# Patient Record
Sex: MALE | Race: WHITE | URBAN - METROPOLITAN AREA
[De-identification: names, ages, dates, MRNs, and addresses within clinical notes are randomized per-mention and may not be internally consistent; named-entity substitution may affect disease eponyms.]

---

## 2023-10-26 ENCOUNTER — EMERGENCY (EMERGENCY)
Facility: HOSPITAL | Age: 28
LOS: 1 days | Discharge: HOME | End: 2023-10-26
Admitting: EMERGENCY MEDICINE
Payer: COMMERCIAL

## 2023-10-26 VITALS
HEIGHT: 73 IN | TEMPERATURE: 98 F | WEIGHT: 179.9 LBS | SYSTOLIC BLOOD PRESSURE: 108 MMHG | HEART RATE: 83 BPM | DIASTOLIC BLOOD PRESSURE: 81 MMHG | RESPIRATION RATE: 16 BRPM | OXYGEN SATURATION: 97 %

## 2023-10-26 VITALS
OXYGEN SATURATION: 98 % | RESPIRATION RATE: 17 BRPM | SYSTOLIC BLOOD PRESSURE: 112 MMHG | TEMPERATURE: 98 F | DIASTOLIC BLOOD PRESSURE: 71 MMHG | HEART RATE: 62 BPM

## 2023-10-26 DIAGNOSIS — R10.11 RIGHT UPPER QUADRANT PAIN: ICD-10-CM

## 2023-10-26 DIAGNOSIS — Z87.19 PERSONAL HISTORY OF OTHER DISEASES OF THE DIGESTIVE SYSTEM: ICD-10-CM

## 2023-10-26 LAB
ALBUMIN SERPL ELPH-MCNC: 4 G/DL — SIGNIFICANT CHANGE UP (ref 3.4–5)
ALBUMIN SERPL ELPH-MCNC: 4 G/DL — SIGNIFICANT CHANGE UP (ref 3.4–5)
ALP SERPL-CCNC: 62 U/L — SIGNIFICANT CHANGE UP (ref 40–120)
ALP SERPL-CCNC: 62 U/L — SIGNIFICANT CHANGE UP (ref 40–120)
ALT FLD-CCNC: 17 U/L — SIGNIFICANT CHANGE UP (ref 12–42)
ALT FLD-CCNC: 17 U/L — SIGNIFICANT CHANGE UP (ref 12–42)
ANION GAP SERPL CALC-SCNC: 4 MMOL/L — LOW (ref 9–16)
ANION GAP SERPL CALC-SCNC: 4 MMOL/L — LOW (ref 9–16)
APPEARANCE UR: CLEAR — SIGNIFICANT CHANGE UP
APPEARANCE UR: CLEAR — SIGNIFICANT CHANGE UP
AST SERPL-CCNC: 16 U/L — SIGNIFICANT CHANGE UP (ref 15–37)
AST SERPL-CCNC: 16 U/L — SIGNIFICANT CHANGE UP (ref 15–37)
BASOPHILS # BLD AUTO: 0.03 K/UL — SIGNIFICANT CHANGE UP (ref 0–0.2)
BASOPHILS # BLD AUTO: 0.03 K/UL — SIGNIFICANT CHANGE UP (ref 0–0.2)
BASOPHILS NFR BLD AUTO: 0.5 % — SIGNIFICANT CHANGE UP (ref 0–2)
BASOPHILS NFR BLD AUTO: 0.5 % — SIGNIFICANT CHANGE UP (ref 0–2)
BILIRUB SERPL-MCNC: 0.4 MG/DL — SIGNIFICANT CHANGE UP (ref 0.2–1.2)
BILIRUB SERPL-MCNC: 0.4 MG/DL — SIGNIFICANT CHANGE UP (ref 0.2–1.2)
BILIRUB UR-MCNC: NEGATIVE — SIGNIFICANT CHANGE UP
BILIRUB UR-MCNC: NEGATIVE — SIGNIFICANT CHANGE UP
BUN SERPL-MCNC: 14 MG/DL — SIGNIFICANT CHANGE UP (ref 7–23)
BUN SERPL-MCNC: 14 MG/DL — SIGNIFICANT CHANGE UP (ref 7–23)
CALCIUM SERPL-MCNC: 9.2 MG/DL — SIGNIFICANT CHANGE UP (ref 8.5–10.5)
CALCIUM SERPL-MCNC: 9.2 MG/DL — SIGNIFICANT CHANGE UP (ref 8.5–10.5)
CHLORIDE SERPL-SCNC: 105 MMOL/L — SIGNIFICANT CHANGE UP (ref 96–108)
CHLORIDE SERPL-SCNC: 105 MMOL/L — SIGNIFICANT CHANGE UP (ref 96–108)
CO2 SERPL-SCNC: 31 MMOL/L — SIGNIFICANT CHANGE UP (ref 22–31)
CO2 SERPL-SCNC: 31 MMOL/L — SIGNIFICANT CHANGE UP (ref 22–31)
COLOR SPEC: YELLOW — SIGNIFICANT CHANGE UP
COLOR SPEC: YELLOW — SIGNIFICANT CHANGE UP
CREAT SERPL-MCNC: 1.06 MG/DL — SIGNIFICANT CHANGE UP (ref 0.5–1.3)
CREAT SERPL-MCNC: 1.06 MG/DL — SIGNIFICANT CHANGE UP (ref 0.5–1.3)
DIFF PNL FLD: NEGATIVE — SIGNIFICANT CHANGE UP
DIFF PNL FLD: NEGATIVE — SIGNIFICANT CHANGE UP
EGFR: 97 ML/MIN/1.73M2 — SIGNIFICANT CHANGE UP
EGFR: 97 ML/MIN/1.73M2 — SIGNIFICANT CHANGE UP
EOSINOPHIL # BLD AUTO: 0.33 K/UL — SIGNIFICANT CHANGE UP (ref 0–0.5)
EOSINOPHIL # BLD AUTO: 0.33 K/UL — SIGNIFICANT CHANGE UP (ref 0–0.5)
EOSINOPHIL NFR BLD AUTO: 5.8 % — SIGNIFICANT CHANGE UP (ref 0–6)
EOSINOPHIL NFR BLD AUTO: 5.8 % — SIGNIFICANT CHANGE UP (ref 0–6)
GLUCOSE SERPL-MCNC: 99 MG/DL — SIGNIFICANT CHANGE UP (ref 70–99)
GLUCOSE SERPL-MCNC: 99 MG/DL — SIGNIFICANT CHANGE UP (ref 70–99)
GLUCOSE UR QL: NEGATIVE MG/DL — SIGNIFICANT CHANGE UP
GLUCOSE UR QL: NEGATIVE MG/DL — SIGNIFICANT CHANGE UP
HCT VFR BLD CALC: 39.9 % — SIGNIFICANT CHANGE UP (ref 39–50)
HCT VFR BLD CALC: 39.9 % — SIGNIFICANT CHANGE UP (ref 39–50)
HGB BLD-MCNC: 13.1 G/DL — SIGNIFICANT CHANGE UP (ref 13–17)
HGB BLD-MCNC: 13.1 G/DL — SIGNIFICANT CHANGE UP (ref 13–17)
IMM GRANULOCYTES NFR BLD AUTO: 0.2 % — SIGNIFICANT CHANGE UP (ref 0–0.9)
IMM GRANULOCYTES NFR BLD AUTO: 0.2 % — SIGNIFICANT CHANGE UP (ref 0–0.9)
KETONES UR-MCNC: NEGATIVE MG/DL — SIGNIFICANT CHANGE UP
KETONES UR-MCNC: NEGATIVE MG/DL — SIGNIFICANT CHANGE UP
LEUKOCYTE ESTERASE UR-ACNC: NEGATIVE — SIGNIFICANT CHANGE UP
LEUKOCYTE ESTERASE UR-ACNC: NEGATIVE — SIGNIFICANT CHANGE UP
LIDOCAIN IGE QN: 19 U/L — SIGNIFICANT CHANGE UP (ref 16–77)
LIDOCAIN IGE QN: 19 U/L — SIGNIFICANT CHANGE UP (ref 16–77)
LYMPHOCYTES # BLD AUTO: 1.83 K/UL — SIGNIFICANT CHANGE UP (ref 1–3.3)
LYMPHOCYTES # BLD AUTO: 1.83 K/UL — SIGNIFICANT CHANGE UP (ref 1–3.3)
LYMPHOCYTES # BLD AUTO: 32.1 % — SIGNIFICANT CHANGE UP (ref 13–44)
LYMPHOCYTES # BLD AUTO: 32.1 % — SIGNIFICANT CHANGE UP (ref 13–44)
MAGNESIUM SERPL-MCNC: 1.9 MG/DL — SIGNIFICANT CHANGE UP (ref 1.6–2.6)
MAGNESIUM SERPL-MCNC: 1.9 MG/DL — SIGNIFICANT CHANGE UP (ref 1.6–2.6)
MCHC RBC-ENTMCNC: 29.8 PG — SIGNIFICANT CHANGE UP (ref 27–34)
MCHC RBC-ENTMCNC: 29.8 PG — SIGNIFICANT CHANGE UP (ref 27–34)
MCHC RBC-ENTMCNC: 32.8 GM/DL — SIGNIFICANT CHANGE UP (ref 32–36)
MCHC RBC-ENTMCNC: 32.8 GM/DL — SIGNIFICANT CHANGE UP (ref 32–36)
MCV RBC AUTO: 90.7 FL — SIGNIFICANT CHANGE UP (ref 80–100)
MCV RBC AUTO: 90.7 FL — SIGNIFICANT CHANGE UP (ref 80–100)
MONOCYTES # BLD AUTO: 0.57 K/UL — SIGNIFICANT CHANGE UP (ref 0–0.9)
MONOCYTES # BLD AUTO: 0.57 K/UL — SIGNIFICANT CHANGE UP (ref 0–0.9)
MONOCYTES NFR BLD AUTO: 10 % — SIGNIFICANT CHANGE UP (ref 2–14)
MONOCYTES NFR BLD AUTO: 10 % — SIGNIFICANT CHANGE UP (ref 2–14)
NEUTROPHILS # BLD AUTO: 2.93 K/UL — SIGNIFICANT CHANGE UP (ref 1.8–7.4)
NEUTROPHILS # BLD AUTO: 2.93 K/UL — SIGNIFICANT CHANGE UP (ref 1.8–7.4)
NEUTROPHILS NFR BLD AUTO: 51.4 % — SIGNIFICANT CHANGE UP (ref 43–77)
NEUTROPHILS NFR BLD AUTO: 51.4 % — SIGNIFICANT CHANGE UP (ref 43–77)
NITRITE UR-MCNC: NEGATIVE — SIGNIFICANT CHANGE UP
NITRITE UR-MCNC: NEGATIVE — SIGNIFICANT CHANGE UP
NRBC # BLD: 0 /100 WBCS — SIGNIFICANT CHANGE UP (ref 0–0)
NRBC # BLD: 0 /100 WBCS — SIGNIFICANT CHANGE UP (ref 0–0)
PH UR: 6 — SIGNIFICANT CHANGE UP (ref 5–8)
PH UR: 6 — SIGNIFICANT CHANGE UP (ref 5–8)
PLATELET # BLD AUTO: 222 K/UL — SIGNIFICANT CHANGE UP (ref 150–400)
PLATELET # BLD AUTO: 222 K/UL — SIGNIFICANT CHANGE UP (ref 150–400)
POTASSIUM SERPL-MCNC: 4 MMOL/L — SIGNIFICANT CHANGE UP (ref 3.5–5.3)
POTASSIUM SERPL-MCNC: 4 MMOL/L — SIGNIFICANT CHANGE UP (ref 3.5–5.3)
POTASSIUM SERPL-SCNC: 4 MMOL/L — SIGNIFICANT CHANGE UP (ref 3.5–5.3)
POTASSIUM SERPL-SCNC: 4 MMOL/L — SIGNIFICANT CHANGE UP (ref 3.5–5.3)
PROT SERPL-MCNC: 7.4 G/DL — SIGNIFICANT CHANGE UP (ref 6.4–8.2)
PROT SERPL-MCNC: 7.4 G/DL — SIGNIFICANT CHANGE UP (ref 6.4–8.2)
PROT UR-MCNC: NEGATIVE MG/DL — SIGNIFICANT CHANGE UP
PROT UR-MCNC: NEGATIVE MG/DL — SIGNIFICANT CHANGE UP
RBC # BLD: 4.4 M/UL — SIGNIFICANT CHANGE UP (ref 4.2–5.8)
RBC # BLD: 4.4 M/UL — SIGNIFICANT CHANGE UP (ref 4.2–5.8)
RBC # FLD: 11.9 % — SIGNIFICANT CHANGE UP (ref 10.3–14.5)
RBC # FLD: 11.9 % — SIGNIFICANT CHANGE UP (ref 10.3–14.5)
SODIUM SERPL-SCNC: 140 MMOL/L — SIGNIFICANT CHANGE UP (ref 132–145)
SODIUM SERPL-SCNC: 140 MMOL/L — SIGNIFICANT CHANGE UP (ref 132–145)
SP GR SPEC: 1.01 — SIGNIFICANT CHANGE UP (ref 1–1.03)
SP GR SPEC: 1.01 — SIGNIFICANT CHANGE UP (ref 1–1.03)
UROBILINOGEN FLD QL: 0.2 MG/DL — SIGNIFICANT CHANGE UP (ref 0.2–1)
UROBILINOGEN FLD QL: 0.2 MG/DL — SIGNIFICANT CHANGE UP (ref 0.2–1)
WBC # BLD: 5.7 K/UL — SIGNIFICANT CHANGE UP (ref 3.8–10.5)
WBC # BLD: 5.7 K/UL — SIGNIFICANT CHANGE UP (ref 3.8–10.5)
WBC # FLD AUTO: 5.7 K/UL — SIGNIFICANT CHANGE UP (ref 3.8–10.5)
WBC # FLD AUTO: 5.7 K/UL — SIGNIFICANT CHANGE UP (ref 3.8–10.5)

## 2023-10-26 PROCEDURE — 99285 EMERGENCY DEPT VISIT HI MDM: CPT

## 2023-10-26 PROCEDURE — 74177 CT ABD & PELVIS W/CONTRAST: CPT | Mod: 26

## 2023-10-26 PROCEDURE — 71046 X-RAY EXAM CHEST 2 VIEWS: CPT | Mod: 26

## 2023-10-26 PROCEDURE — 76705 ECHO EXAM OF ABDOMEN: CPT | Mod: 26

## 2023-10-26 RX ORDER — MORPHINE SULFATE 50 MG/1
4 CAPSULE, EXTENDED RELEASE ORAL ONCE
Refills: 0 | Status: DISCONTINUED | OUTPATIENT
Start: 2023-10-26 | End: 2023-10-26

## 2023-10-26 RX ORDER — ONDANSETRON 8 MG/1
4 TABLET, FILM COATED ORAL ONCE
Refills: 0 | Status: COMPLETED | OUTPATIENT
Start: 2023-10-26 | End: 2023-10-26

## 2023-10-26 RX ORDER — SODIUM CHLORIDE 9 MG/ML
1000 INJECTION INTRAMUSCULAR; INTRAVENOUS; SUBCUTANEOUS ONCE
Refills: 0 | Status: COMPLETED | OUTPATIENT
Start: 2023-10-26 | End: 2023-10-26

## 2023-10-26 RX ORDER — KETOROLAC TROMETHAMINE 30 MG/ML
30 SYRINGE (ML) INJECTION ONCE
Refills: 0 | Status: DISCONTINUED | OUTPATIENT
Start: 2023-10-26 | End: 2023-10-26

## 2023-10-26 RX ADMIN — ONDANSETRON 4 MILLIGRAM(S): 8 TABLET, FILM COATED ORAL at 11:40

## 2023-10-26 RX ADMIN — Medication 30 MILLIGRAM(S): at 14:27

## 2023-10-26 RX ADMIN — SODIUM CHLORIDE 1000 MILLILITER(S): 9 INJECTION INTRAMUSCULAR; INTRAVENOUS; SUBCUTANEOUS at 11:33

## 2023-10-26 RX ADMIN — MORPHINE SULFATE 4 MILLIGRAM(S): 50 CAPSULE, EXTENDED RELEASE ORAL at 11:39

## 2023-10-26 NOTE — ED PROVIDER NOTE - CLINICAL SUMMARY MEDICAL DECISION MAKING FREE TEXT BOX
young male with RUQ abd pain, hx gallstones, will obtain labs, US r/o cholecystitis, analgesia, IVF, consider CT abdomen if US inconclusive and persistent symptoms.

## 2023-10-26 NOTE — ED ADULT NURSE REASSESSMENT NOTE - NS ED NURSE REASSESS COMMENT FT1
Break coverage for pascual lees, introduced self to patient, pain meds given iv, NKDA, gcs 15 nil obvious distress awaiting medical review

## 2023-10-26 NOTE — ED ADULT NURSE NOTE - NS ED PATIENT SAFETY CONCERN
GENERAL PRE-PROCEDURE:   Procedure:  Chest port placement with procedural sedation  Date/Time:  4/22/2021 2:46 PM    Written consent obtained?: Yes    Risks and benefits: Risks, benefits and alternatives were discussed    Consent given by:  Guardian  Patient states understanding of procedure being performed: Yes    Patient's understanding of procedure matches consent: Yes    Procedure consent matches procedure scheduled: Yes    Expected level of sedation:  Moderate  Appropriately NPO:  Yes  ASA Class:  Class 2- mild systemic disease, no acute problems, no functional limitations  Mallampati  :  Grade 2- soft palate, base of uvula, tonsillar pillars, and portion of posterior pharyngeal wall visible  Lungs:  Lungs clear with good breath sounds bilaterally  Heart:  Normal heart sounds and rate  History & Physical reviewed:  History and physical reviewed and no updates needed  Statement of review:  I have reviewed the lab findings, diagnostic data, medications, and the plan for sedation     No

## 2023-10-26 NOTE — ED PROVIDER NOTE - PATIENT PORTAL LINK FT
You can access the FollowMyHealth Patient Portal offered by [facility] by registering at the following website: [email or web address]. By joining [employer]’s FollowMyHealth portal, you will also be able to view your health information using other applications (apps) compatible with our system.

## 2023-10-26 NOTE — ED ADULT NURSE NOTE - DOES PATIENT HAVE ADVANCE DIRECTIVE
[Has the patient missed work because of the injury/illness?] : The patient has missed work because of the injury/illness. [No] : The patient is currently not working. [de-identified] : CC LEFT shoulder\par \par HPI 55 y RHF presents for 2mo fu of acute onset 6 weeks pain sc and lateral and neck and posterior right shoulder lifting heavy object. \par The pain is WORSE, and rated a 10 out of 10, described as severe WITH radiation\par nothing makes the pain better and everything makes the pain worse.\par The patient reports associated symptoms of hand tingling\par The patient DENIES pain at night affecting sleep, DENIES neck pain, and DENIES similar pain previously.\par \par The patient has tried the following treatments:\par Activity modification	+\par Ice			+\par Nsaids    		+\par Physical Therapy  	-\par Cortisone Injection	+ subacromial 3-21-22 several days help\par Arthroscopy/Surgery	-\par \par pain continues\par in light duty, able to do wo significant issues\par \par Review of Systems is positive for the above musculoskeletal symptoms and is otherwise non-contributory for general, constitutional, psychiatric, neurologic, HEENT, cardiac, respiratory, gastrointestinal, reproductive, lymphatic, and dermatologic complaints.\par \par Consult by Dr MARTINEZ\par \par  [FreeTextEntry1] : pain and limited motion and strength 2 mo after lifting heavy object [FreeTextEntry2] : HHA began early feb [FreeTextEntry3] : self care, cleaning, cooking [FreeTextEntry4] : CSI several days improvement 3-21-22 [FreeTextEntry6] : 2-23-22 No

## 2023-10-26 NOTE — ED PROVIDER NOTE - OBJECTIVE STATEMENT
27 yo male with hx gallstones presents c/o RUQ pain since this morning, no nausea/vomiting/fever or chills. no cp/sob. no flank pain or urinary symptoms.

## 2023-10-26 NOTE — ED ADULT NURSE NOTE - NSFALLUNIVINTERV_ED_ALL_ED
Bed/Stretcher in lowest position, wheels locked, appropriate side rails in place/Call bell, personal items and telephone in reach/Instruct patient to call for assistance before getting out of bed/chair/stretcher/Non-slip footwear applied when patient is off stretcher/[place] to call system/Physically safe environment - no spills, clutter or unnecessary equipment/Purposeful proactive rounding/Room/bathroom lighting operational, light cord in reach

## 2023-10-26 NOTE — ED ADULT NURSE NOTE - OBJECTIVE STATEMENT
Pt with RUQ pain since 3 am, has been constant. Sent from . Nausea, no emesis, no diarrhea, no fevers, no abdominal surgical hx.

## 2023-10-26 NOTE — ED PROVIDER NOTE - PROGRESS NOTE DETAILS
US no stones, CBD normal size. CT abdomen ordered with no acute findings. patient looks well, feeling better. discussed all labs and radiology findings, he was advised to follow up GI/pmd. he agrees with plan

## 2023-10-26 NOTE — ED PROVIDER NOTE - NSFOLLOWUPINSTRUCTIONS_ED_ALL_ED_FT

## 2023-10-26 NOTE — ED PROVIDER NOTE - PHYSICAL EXAMINATION
CONSTITUTIONAL: Well-appearing; well-nourished; in no apparent distress.   	HEAD: Normocephalic; atraumatic.   	EYES:  conjunctiva and sclera clear  	ENT: normal nose; no rhinorrhea; normal pharynx with no erythema or lesions.   	NECK: Supple; non-tender;   	CARDIOVASCULAR: Normal S1, S2; no murmurs, rubs, or gallops. Regular rate and rhythm.   	RESPIRATORY: Breathing easily; breath sounds clear and equal bilaterally; no wheezes, rhonchi, or rales.  	GI: Soft; non-distended; mild RUQ tenderness. no guarding or rebound. no cvat bilaterally  	EXT: MADRID x 4.   	SKIN: Normal for age and race; warm; dry; good turgor; no apparent lesions or rash.   	NEURO: A & O x 3; face symmetric; grossly unremarkable.   PSYCHOLOGICAL: The patient’s mood and manner are appropriate.

## 2025-05-24 ENCOUNTER — EMERGENCY (EMERGENCY)
Facility: HOSPITAL | Age: 30
LOS: 1 days | End: 2025-05-24
Attending: EMERGENCY MEDICINE | Admitting: EMERGENCY MEDICINE
Payer: COMMERCIAL

## 2025-05-24 VITALS
TEMPERATURE: 98 F | DIASTOLIC BLOOD PRESSURE: 88 MMHG | OXYGEN SATURATION: 99 % | SYSTOLIC BLOOD PRESSURE: 156 MMHG | RESPIRATION RATE: 17 BRPM | HEART RATE: 82 BPM

## 2025-05-24 VITALS
HEART RATE: 97 BPM | WEIGHT: 179.9 LBS | RESPIRATION RATE: 16 BRPM | SYSTOLIC BLOOD PRESSURE: 149 MMHG | DIASTOLIC BLOOD PRESSURE: 88 MMHG | OXYGEN SATURATION: 98 % | HEIGHT: 73 IN | TEMPERATURE: 97 F

## 2025-05-24 PROCEDURE — 99284 EMERGENCY DEPT VISIT MOD MDM: CPT

## 2025-05-24 RX ORDER — IBUPROFEN 200 MG
800 TABLET ORAL ONCE
Refills: 0 | Status: COMPLETED | OUTPATIENT
Start: 2025-05-24 | End: 2025-05-24

## 2025-05-24 RX ORDER — IBUPROFEN 200 MG
1 TABLET ORAL
Qty: 21 | Refills: 0
Start: 2025-05-24 | End: 2025-05-30

## 2025-05-24 RX ORDER — OXYCODONE HYDROCHLORIDE AND ACETAMINOPHEN 10; 325 MG/1; MG/1
1 TABLET ORAL
Qty: 12 | Refills: 0
Start: 2025-05-24 | End: 2025-05-26

## 2025-05-24 RX ORDER — OXYCODONE HYDROCHLORIDE AND ACETAMINOPHEN 10; 325 MG/1; MG/1
1 TABLET ORAL ONCE
Refills: 0 | Status: DISCONTINUED | OUTPATIENT
Start: 2025-05-24 | End: 2025-05-24

## 2025-05-24 RX ADMIN — OXYCODONE HYDROCHLORIDE AND ACETAMINOPHEN 1 TABLET(S): 10; 325 TABLET ORAL at 14:45

## 2025-05-24 RX ADMIN — Medication 1000 MILLIGRAM(S): at 14:45

## 2025-05-24 RX ADMIN — Medication 800 MILLIGRAM(S): at 14:45

## 2025-05-24 NOTE — ED ADULT TRIAGE NOTE - CHIEF COMPLAINT QUOTE
Walk in sent from  for eval of Rt sided headache x5 days. Pt reports progressive onset symptoms which have been worsening and now affecting his balance and causing blurry vision. PMH migraines.

## 2025-05-24 NOTE — ED PROVIDER NOTE - PHYSICAL EXAMINATION
Vesicular rash on right scalp extending from upper neck towards right upper forehead.  Mild tenderness to palpation to this area.

## 2025-05-24 NOTE — ED PROVIDER NOTE - CLINICAL SUMMARY MEDICAL DECISION MAKING FREE TEXT BOX
29-year-old male with right-sided headache and rash.  PE as above.    Physical exam consistent with shingles.  Will give pain medications and Valtrex and reassess.

## 2025-05-24 NOTE — ED PROVIDER NOTE - PATIENT PORTAL LINK FT
You can access the FollowMyHealth Patient Portal offered by [facility] by registering at the following website: [email or web address]. By joining [employer]’s FollowMyHealth portal, you will also be able to view your health information using other applications (apps) compatible with our system.

## 2025-05-24 NOTE — ED PROVIDER NOTE - PROGRESS NOTE DETAILS
Pain improved.  Will discharge with pain medications and Valtrex.  Follow-up with primary care doctor.  Return precautions discussed.

## 2025-05-24 NOTE — ED PROVIDER NOTE - OBJECTIVE STATEMENT
29-year-old male denies past medical history coming in with a 2 to 3-day history of right sided headache.  Noticed there are also some small red bumps on his scalp.   never had symptoms like this before.  Went to urgent care and was referred to the ED for evaluation.   also has some mild flulike symptoms.

## 2025-05-26 DIAGNOSIS — B02.9 ZOSTER WITHOUT COMPLICATIONS: ICD-10-CM

## 2025-05-26 DIAGNOSIS — R51.9 HEADACHE, UNSPECIFIED: ICD-10-CM

## 2025-05-28 ENCOUNTER — INPATIENT (INPATIENT)
Age: 30
LOS: 2 days | Discharge: HOME | End: 2025-05-31
Attending: INTERNAL MEDICINE | Admitting: STUDENT IN AN ORGANIZED HEALTH CARE EDUCATION/TRAINING PROGRAM
Payer: COMMERCIAL

## 2025-05-28 VITALS
RESPIRATION RATE: 18 BRPM | DIASTOLIC BLOOD PRESSURE: 97 MMHG | WEIGHT: 179.9 LBS | HEIGHT: 73 IN | HEART RATE: 97 BPM | SYSTOLIC BLOOD PRESSURE: 143 MMHG | TEMPERATURE: 98 F | OXYGEN SATURATION: 98 %

## 2025-05-28 LAB
ALBUMIN SERPL ELPH-MCNC: 4.2 G/DL — SIGNIFICANT CHANGE UP (ref 3.4–5)
ALP SERPL-CCNC: 62 U/L — SIGNIFICANT CHANGE UP (ref 40–120)
ALT FLD-CCNC: 31 U/L — SIGNIFICANT CHANGE UP (ref 12–42)
ANION GAP SERPL CALC-SCNC: 9 MMOL/L — SIGNIFICANT CHANGE UP (ref 9–16)
AST SERPL-CCNC: 23 U/L — SIGNIFICANT CHANGE UP (ref 15–37)
BASOPHILS # BLD AUTO: 0.05 K/UL — SIGNIFICANT CHANGE UP (ref 0–0.2)
BASOPHILS NFR BLD AUTO: 0.8 % — SIGNIFICANT CHANGE UP (ref 0–2)
BILIRUB SERPL-MCNC: 0.4 MG/DL — SIGNIFICANT CHANGE UP (ref 0.2–1.2)
BUN SERPL-MCNC: 16 MG/DL — SIGNIFICANT CHANGE UP (ref 7–23)
CALCIUM SERPL-MCNC: 9.1 MG/DL — SIGNIFICANT CHANGE UP (ref 8.5–10.5)
CHLORIDE SERPL-SCNC: 103 MMOL/L — SIGNIFICANT CHANGE UP (ref 96–108)
CO2 SERPL-SCNC: 28 MMOL/L — SIGNIFICANT CHANGE UP (ref 22–31)
CREAT SERPL-MCNC: 1.03 MG/DL — SIGNIFICANT CHANGE UP (ref 0.5–1.3)
EGFR: 100 ML/MIN/1.73M2 — SIGNIFICANT CHANGE UP
EGFR: 100 ML/MIN/1.73M2 — SIGNIFICANT CHANGE UP
EOSINOPHIL # BLD AUTO: 0.22 K/UL — SIGNIFICANT CHANGE UP (ref 0–0.5)
EOSINOPHIL NFR BLD AUTO: 3.6 % — SIGNIFICANT CHANGE UP (ref 0–6)
GLUCOSE CSF-MCNC: 61 MG/DL — SIGNIFICANT CHANGE UP (ref 40–70)
GLUCOSE SERPL-MCNC: 91 MG/DL — SIGNIFICANT CHANGE UP (ref 70–99)
HCT VFR BLD CALC: 41.8 % — SIGNIFICANT CHANGE UP (ref 39–50)
HGB BLD-MCNC: 13.8 G/DL — SIGNIFICANT CHANGE UP (ref 13–17)
HIV 1 & 2 AB SERPL IA.RAPID: SIGNIFICANT CHANGE UP
IMM GRANULOCYTES # BLD AUTO: 0.02 K/UL — SIGNIFICANT CHANGE UP (ref 0–0.07)
IMM GRANULOCYTES NFR BLD AUTO: 0.3 % — SIGNIFICANT CHANGE UP (ref 0–0.9)
LACTATE BLDV-MCNC: 1.6 MMOL/L — SIGNIFICANT CHANGE UP (ref 0.5–2)
LYMPHOCYTES # BLD AUTO: 1.95 K/UL — SIGNIFICANT CHANGE UP (ref 1–3.3)
LYMPHOCYTES NFR BLD AUTO: 32 % — SIGNIFICANT CHANGE UP (ref 13–44)
MCHC RBC-ENTMCNC: 29.8 PG — SIGNIFICANT CHANGE UP (ref 27–34)
MCHC RBC-ENTMCNC: 33 G/DL — SIGNIFICANT CHANGE UP (ref 32–36)
MCV RBC AUTO: 90.3 FL — SIGNIFICANT CHANGE UP (ref 80–100)
MONOCYTES # BLD AUTO: 0.57 K/UL — SIGNIFICANT CHANGE UP (ref 0–0.9)
MONOCYTES NFR BLD AUTO: 9.3 % — SIGNIFICANT CHANGE UP (ref 2–14)
NEUTROPHILS # BLD AUTO: 3.29 K/UL — SIGNIFICANT CHANGE UP (ref 1.8–7.4)
NEUTROPHILS NFR BLD AUTO: 54 % — SIGNIFICANT CHANGE UP (ref 43–77)
NRBC # BLD AUTO: 0 K/UL — SIGNIFICANT CHANGE UP (ref 0–0)
NRBC # FLD: 0 K/UL — SIGNIFICANT CHANGE UP (ref 0–0)
NRBC BLD AUTO-RTO: 0 /100 WBCS — SIGNIFICANT CHANGE UP (ref 0–0)
PLATELET # BLD AUTO: 234 K/UL — SIGNIFICANT CHANGE UP (ref 150–400)
PMV BLD: 9.4 FL — SIGNIFICANT CHANGE UP (ref 7–13)
POTASSIUM SERPL-MCNC: 4.1 MMOL/L — SIGNIFICANT CHANGE UP (ref 3.5–5.3)
POTASSIUM SERPL-SCNC: 4.1 MMOL/L — SIGNIFICANT CHANGE UP (ref 3.5–5.3)
PROT CSF-MCNC: 55 MG/DL — HIGH (ref 15–45)
PROT SERPL-MCNC: 8.3 G/DL — HIGH (ref 6.4–8.2)
RBC # BLD: 4.63 M/UL — SIGNIFICANT CHANGE UP (ref 4.2–5.8)
RBC # FLD: 12.4 % — SIGNIFICANT CHANGE UP (ref 10.3–14.5)
SODIUM SERPL-SCNC: 140 MMOL/L — SIGNIFICANT CHANGE UP (ref 132–145)
WBC # BLD: 6.1 K/UL — SIGNIFICANT CHANGE UP (ref 3.8–10.5)
WBC # FLD AUTO: 6.1 K/UL — SIGNIFICANT CHANGE UP (ref 3.8–10.5)

## 2025-05-28 PROCEDURE — 70450 CT HEAD/BRAIN W/O DYE: CPT | Mod: 26

## 2025-05-28 PROCEDURE — 99285 EMERGENCY DEPT VISIT HI MDM: CPT

## 2025-05-28 RX ORDER — METOCLOPRAMIDE HCL 10 MG
10 TABLET ORAL ONCE
Refills: 0 | Status: COMPLETED | OUTPATIENT
Start: 2025-05-28 | End: 2025-05-28

## 2025-05-28 RX ORDER — KETOROLAC TROMETHAMINE 30 MG/ML
30 INJECTION, SOLUTION INTRAMUSCULAR; INTRAVENOUS ONCE
Refills: 0 | Status: DISCONTINUED | OUTPATIENT
Start: 2025-05-28 | End: 2025-05-28

## 2025-05-28 RX ORDER — CEFTRIAXONE 500 MG/1
2000 INJECTION, POWDER, FOR SOLUTION INTRAMUSCULAR; INTRAVENOUS ONCE
Refills: 0 | Status: COMPLETED | OUTPATIENT
Start: 2025-05-28 | End: 2025-05-28

## 2025-05-29 DIAGNOSIS — Z00.00 ENCOUNTER FOR GENERAL ADULT MEDICAL EXAMINATION WITHOUT ABNORMAL FINDINGS: ICD-10-CM

## 2025-05-29 DIAGNOSIS — G04.90 ENCEPHALITIS AND ENCEPHALOMYELITIS, UNSPECIFIED: ICD-10-CM

## 2025-05-29 LAB
ANION GAP SERPL CALC-SCNC: 11 MMOL/L — SIGNIFICANT CHANGE UP (ref 5–17)
BASOPHILS # BLD AUTO: 0.06 K/UL — SIGNIFICANT CHANGE UP (ref 0–0.2)
BASOPHILS NFR BLD AUTO: 0.9 % — SIGNIFICANT CHANGE UP (ref 0–2)
BUN SERPL-MCNC: 22 MG/DL — SIGNIFICANT CHANGE UP (ref 7–23)
CALCIUM SERPL-MCNC: 8.9 MG/DL — SIGNIFICANT CHANGE UP (ref 8.4–10.5)
CHLORIDE SERPL-SCNC: 106 MMOL/L — SIGNIFICANT CHANGE UP (ref 96–108)
CO2 SERPL-SCNC: 23 MMOL/L — SIGNIFICANT CHANGE UP (ref 22–31)
CREAT SERPL-MCNC: 1.13 MG/DL — SIGNIFICANT CHANGE UP (ref 0.5–1.3)
CSF PCR RESULT: SIGNIFICANT CHANGE UP
EGFR: 90 ML/MIN/1.73M2 — SIGNIFICANT CHANGE UP
EGFR: 90 ML/MIN/1.73M2 — SIGNIFICANT CHANGE UP
EOSINOPHIL # BLD AUTO: 0.39 K/UL — SIGNIFICANT CHANGE UP (ref 0–0.5)
EOSINOPHIL NFR BLD AUTO: 5.7 % — SIGNIFICANT CHANGE UP (ref 0–6)
GLUCOSE SERPL-MCNC: 95 MG/DL — SIGNIFICANT CHANGE UP (ref 70–99)
GRAM STN FLD: SIGNIFICANT CHANGE UP
HCT VFR BLD CALC: 38.7 % — LOW (ref 39–50)
HGB BLD-MCNC: 13 G/DL — SIGNIFICANT CHANGE UP (ref 13–17)
IMM GRANULOCYTES NFR BLD AUTO: 0.3 % — SIGNIFICANT CHANGE UP (ref 0–0.9)
LYMPHOCYTES # BLD AUTO: 2.34 K/UL — SIGNIFICANT CHANGE UP (ref 1–3.3)
LYMPHOCYTES # BLD AUTO: 34.3 % — SIGNIFICANT CHANGE UP (ref 13–44)
MAGNESIUM SERPL-MCNC: 2.2 MG/DL — SIGNIFICANT CHANGE UP (ref 1.6–2.6)
MCHC RBC-ENTMCNC: 31.1 PG — SIGNIFICANT CHANGE UP (ref 27–34)
MCHC RBC-ENTMCNC: 33.6 G/DL — SIGNIFICANT CHANGE UP (ref 32–36)
MCV RBC AUTO: 92.6 FL — SIGNIFICANT CHANGE UP (ref 80–100)
MONOCYTES # BLD AUTO: 0.76 K/UL — SIGNIFICANT CHANGE UP (ref 0–0.9)
MONOCYTES NFR BLD AUTO: 11.1 % — SIGNIFICANT CHANGE UP (ref 2–14)
NEUTROPHILS # BLD AUTO: 3.25 K/UL — SIGNIFICANT CHANGE UP (ref 1.8–7.4)
NEUTROPHILS NFR BLD AUTO: 47.7 % — SIGNIFICANT CHANGE UP (ref 43–77)
NRBC BLD AUTO-RTO: 0 /100 WBCS — SIGNIFICANT CHANGE UP (ref 0–0)
PHOSPHATE SERPL-MCNC: 5.1 MG/DL — HIGH (ref 2.5–4.5)
PLATELET # BLD AUTO: 225 K/UL — SIGNIFICANT CHANGE UP (ref 150–400)
POTASSIUM SERPL-MCNC: 4.3 MMOL/L — SIGNIFICANT CHANGE UP (ref 3.5–5.3)
POTASSIUM SERPL-SCNC: 4.3 MMOL/L — SIGNIFICANT CHANGE UP (ref 3.5–5.3)
RBC # BLD: 4.18 M/UL — LOW (ref 4.2–5.8)
RBC # FLD: 12.3 % — SIGNIFICANT CHANGE UP (ref 10.3–14.5)
SODIUM SERPL-SCNC: 140 MMOL/L — SIGNIFICANT CHANGE UP (ref 135–145)
SPECIMEN SOURCE: SIGNIFICANT CHANGE UP
WBC # BLD: 6.82 K/UL — SIGNIFICANT CHANGE UP (ref 3.8–10.5)
WBC # FLD AUTO: 6.82 K/UL — SIGNIFICANT CHANGE UP (ref 3.8–10.5)

## 2025-05-29 PROCEDURE — 93010 ELECTROCARDIOGRAM REPORT: CPT

## 2025-05-29 PROCEDURE — 99222 1ST HOSP IP/OBS MODERATE 55: CPT

## 2025-05-29 PROCEDURE — 99223 1ST HOSP IP/OBS HIGH 75: CPT

## 2025-05-29 RX ORDER — OXYCODONE HYDROCHLORIDE 30 MG/1
5 TABLET ORAL EVERY 6 HOURS
Refills: 0 | Status: DISCONTINUED | OUTPATIENT
Start: 2025-05-29 | End: 2025-05-31

## 2025-05-29 RX ORDER — KETOROLAC TROMETHAMINE 30 MG/ML
15 INJECTION, SOLUTION INTRAMUSCULAR; INTRAVENOUS ONCE
Refills: 0 | Status: DISCONTINUED | OUTPATIENT
Start: 2025-05-29 | End: 2025-05-29

## 2025-05-29 RX ORDER — ACETAMINOPHEN 500 MG/5ML
650 LIQUID (ML) ORAL EVERY 6 HOURS
Refills: 0 | Status: DISCONTINUED | OUTPATIENT
Start: 2025-05-29 | End: 2025-05-31

## 2025-05-29 RX ORDER — KETOROLAC TROMETHAMINE 30 MG/ML
15 INJECTION, SOLUTION INTRAMUSCULAR; INTRAVENOUS EVERY 6 HOURS
Refills: 0 | Status: DISCONTINUED | OUTPATIENT
Start: 2025-05-29 | End: 2025-05-31

## 2025-05-29 RX ORDER — ONDANSETRON HCL/PF 4 MG/2 ML
4 VIAL (ML) INJECTION EVERY 8 HOURS
Refills: 0 | Status: DISCONTINUED | OUTPATIENT
Start: 2025-05-29 | End: 2025-05-31

## 2025-05-29 RX ORDER — DIAZEPAM 5 MG/1
1 TABLET ORAL
Refills: 0 | DISCHARGE

## 2025-05-29 RX ADMIN — Medication 650 MILLIGRAM(S): at 09:43

## 2025-05-29 RX ADMIN — Medication 650 MILLIGRAM(S): at 15:50

## 2025-05-29 RX ADMIN — KETOROLAC TROMETHAMINE 15 MILLIGRAM(S): 30 INJECTION, SOLUTION INTRAMUSCULAR; INTRAVENOUS at 19:38

## 2025-05-29 RX ADMIN — Medication 266 MILLIGRAM(S): at 17:16

## 2025-05-29 RX ADMIN — Medication 650 MILLIGRAM(S): at 10:43

## 2025-05-29 RX ADMIN — Medication 100 MILLILITER(S): at 09:42

## 2025-05-29 RX ADMIN — Medication 266 MILLIGRAM(S): at 09:19

## 2025-05-29 RX ADMIN — OXYCODONE HYDROCHLORIDE 5 MILLIGRAM(S): 30 TABLET ORAL at 19:36

## 2025-05-29 RX ADMIN — Medication 650 MILLIGRAM(S): at 16:50

## 2025-05-29 RX ADMIN — KETOROLAC TROMETHAMINE 15 MILLIGRAM(S): 30 INJECTION, SOLUTION INTRAMUSCULAR; INTRAVENOUS at 19:10

## 2025-05-29 RX ADMIN — Medication 266 MILLIGRAM(S): at 01:47

## 2025-05-30 LAB
ALBUMIN SERPL ELPH-MCNC: 3.8 G/DL — SIGNIFICANT CHANGE UP (ref 3.3–5)
ALP SERPL-CCNC: 53 U/L — SIGNIFICANT CHANGE UP (ref 40–120)
ALT FLD-CCNC: 17 U/L — SIGNIFICANT CHANGE UP (ref 10–45)
ANION GAP SERPL CALC-SCNC: 9 MMOL/L — SIGNIFICANT CHANGE UP (ref 5–17)
AST SERPL-CCNC: 16 U/L — SIGNIFICANT CHANGE UP (ref 10–40)
BASOPHILS # BLD AUTO: 0.05 K/UL — SIGNIFICANT CHANGE UP (ref 0–0.2)
BASOPHILS NFR BLD AUTO: 0.9 % — SIGNIFICANT CHANGE UP (ref 0–2)
BILIRUB SERPL-MCNC: 0.3 MG/DL — SIGNIFICANT CHANGE UP (ref 0.2–1.2)
BUN SERPL-MCNC: 15 MG/DL — SIGNIFICANT CHANGE UP (ref 7–23)
CALCIUM SERPL-MCNC: 8.5 MG/DL — SIGNIFICANT CHANGE UP (ref 8.4–10.5)
CHLORIDE SERPL-SCNC: 109 MMOL/L — HIGH (ref 96–108)
CO2 SERPL-SCNC: 24 MMOL/L — SIGNIFICANT CHANGE UP (ref 22–31)
CREAT SERPL-MCNC: 0.95 MG/DL — SIGNIFICANT CHANGE UP (ref 0.5–1.3)
EGFR: 110 ML/MIN/1.73M2 — SIGNIFICANT CHANGE UP
EGFR: 110 ML/MIN/1.73M2 — SIGNIFICANT CHANGE UP
EOSINOPHIL # BLD AUTO: 0.32 K/UL — SIGNIFICANT CHANGE UP (ref 0–0.5)
EOSINOPHIL NFR BLD AUTO: 5.4 % — SIGNIFICANT CHANGE UP (ref 0–6)
GLUCOSE SERPL-MCNC: 95 MG/DL — SIGNIFICANT CHANGE UP (ref 70–99)
HCT VFR BLD CALC: 35.2 % — LOW (ref 39–50)
HGB BLD-MCNC: 11.7 G/DL — LOW (ref 13–17)
HSV+VZV DNA SPEC QL NAA+PROBE: SIGNIFICANT CHANGE UP
IMM GRANULOCYTES NFR BLD AUTO: 0.2 % — SIGNIFICANT CHANGE UP (ref 0–0.9)
LYMPHOCYTES # BLD AUTO: 2.04 K/UL — SIGNIFICANT CHANGE UP (ref 1–3.3)
LYMPHOCYTES # BLD AUTO: 34.7 % — SIGNIFICANT CHANGE UP (ref 13–44)
MAGNESIUM SERPL-MCNC: 2.1 MG/DL — SIGNIFICANT CHANGE UP (ref 1.6–2.6)
MCHC RBC-ENTMCNC: 30.2 PG — SIGNIFICANT CHANGE UP (ref 27–34)
MCHC RBC-ENTMCNC: 33.2 G/DL — SIGNIFICANT CHANGE UP (ref 32–36)
MCV RBC AUTO: 90.7 FL — SIGNIFICANT CHANGE UP (ref 80–100)
MONOCYTES # BLD AUTO: 0.64 K/UL — SIGNIFICANT CHANGE UP (ref 0–0.9)
MONOCYTES NFR BLD AUTO: 10.9 % — SIGNIFICANT CHANGE UP (ref 2–14)
NEUTROPHILS # BLD AUTO: 2.82 K/UL — SIGNIFICANT CHANGE UP (ref 1.8–7.4)
NEUTROPHILS NFR BLD AUTO: 47.9 % — SIGNIFICANT CHANGE UP (ref 43–77)
NRBC BLD AUTO-RTO: 0 /100 WBCS — SIGNIFICANT CHANGE UP (ref 0–0)
PHOSPHATE SERPL-MCNC: 4.4 MG/DL — SIGNIFICANT CHANGE UP (ref 2.5–4.5)
PLATELET # BLD AUTO: 191 K/UL — SIGNIFICANT CHANGE UP (ref 150–400)
POTASSIUM SERPL-MCNC: 4.3 MMOL/L — SIGNIFICANT CHANGE UP (ref 3.5–5.3)
POTASSIUM SERPL-SCNC: 4.3 MMOL/L — SIGNIFICANT CHANGE UP (ref 3.5–5.3)
PROT SERPL-MCNC: 5.8 G/DL — LOW (ref 6–8.3)
RBC # BLD: 3.88 M/UL — LOW (ref 4.2–5.8)
RBC # FLD: 12.4 % — SIGNIFICANT CHANGE UP (ref 10.3–14.5)
SODIUM SERPL-SCNC: 142 MMOL/L — SIGNIFICANT CHANGE UP (ref 135–145)
SPECIMEN SOURCE: SIGNIFICANT CHANGE UP
WBC # BLD: 5.88 K/UL — SIGNIFICANT CHANGE UP (ref 3.8–10.5)
WBC # FLD AUTO: 5.88 K/UL — SIGNIFICANT CHANGE UP (ref 3.8–10.5)

## 2025-05-30 PROCEDURE — 99232 SBSQ HOSP IP/OBS MODERATE 35: CPT

## 2025-05-30 PROCEDURE — 70553 MRI BRAIN STEM W/O & W/DYE: CPT | Mod: 26

## 2025-05-30 PROCEDURE — 36569 INSJ PICC 5 YR+ W/O IMAGING: CPT

## 2025-05-30 RX ORDER — DIAZEPAM 5 MG/1
2 TABLET ORAL
Refills: 0 | Status: DISCONTINUED | OUTPATIENT
Start: 2025-05-30 | End: 2025-05-31

## 2025-05-30 RX ORDER — DIAZEPAM 5 MG/1
2 TABLET ORAL THREE TIMES A DAY
Refills: 0 | Status: DISCONTINUED | OUTPATIENT
Start: 2025-05-30 | End: 2025-05-30

## 2025-05-30 RX ADMIN — Medication 650 MILLIGRAM(S): at 10:21

## 2025-05-30 RX ADMIN — OXYCODONE HYDROCHLORIDE 5 MILLIGRAM(S): 30 TABLET ORAL at 15:26

## 2025-05-30 RX ADMIN — OXYCODONE HYDROCHLORIDE 5 MILLIGRAM(S): 30 TABLET ORAL at 21:27

## 2025-05-30 RX ADMIN — OXYCODONE HYDROCHLORIDE 5 MILLIGRAM(S): 30 TABLET ORAL at 22:15

## 2025-05-30 RX ADMIN — Medication 266 MILLIGRAM(S): at 09:10

## 2025-05-30 RX ADMIN — Medication 100 MILLILITER(S): at 21:28

## 2025-05-30 RX ADMIN — Medication 266 MILLIGRAM(S): at 17:33

## 2025-05-30 RX ADMIN — Medication 266 MILLIGRAM(S): at 00:13

## 2025-05-30 RX ADMIN — Medication 650 MILLIGRAM(S): at 11:15

## 2025-05-30 RX ADMIN — DIAZEPAM 2 MILLIGRAM(S): 5 TABLET ORAL at 14:26

## 2025-05-30 RX ADMIN — Medication 100 MILLILITER(S): at 11:14

## 2025-05-30 RX ADMIN — OXYCODONE HYDROCHLORIDE 5 MILLIGRAM(S): 30 TABLET ORAL at 14:26

## 2025-05-30 RX ADMIN — DIAZEPAM 2 MILLIGRAM(S): 5 TABLET ORAL at 19:01

## 2025-05-31 VITALS
DIASTOLIC BLOOD PRESSURE: 71 MMHG | HEART RATE: 59 BPM | SYSTOLIC BLOOD PRESSURE: 119 MMHG | OXYGEN SATURATION: 98 % | RESPIRATION RATE: 20 BRPM | TEMPERATURE: 98 F

## 2025-05-31 LAB
ANION GAP SERPL CALC-SCNC: 7 MMOL/L — SIGNIFICANT CHANGE UP (ref 5–17)
BASOPHILS # BLD AUTO: 0.05 K/UL — SIGNIFICANT CHANGE UP (ref 0–0.2)
BASOPHILS NFR BLD AUTO: 0.9 % — SIGNIFICANT CHANGE UP (ref 0–2)
BUN SERPL-MCNC: 10 MG/DL — SIGNIFICANT CHANGE UP (ref 7–23)
CALCIUM SERPL-MCNC: 8.6 MG/DL — SIGNIFICANT CHANGE UP (ref 8.4–10.5)
CHLORIDE SERPL-SCNC: 108 MMOL/L — SIGNIFICANT CHANGE UP (ref 96–108)
CO2 SERPL-SCNC: 26 MMOL/L — SIGNIFICANT CHANGE UP (ref 22–31)
CREAT SERPL-MCNC: 0.92 MG/DL — SIGNIFICANT CHANGE UP (ref 0.5–1.3)
EGFR: 115 ML/MIN/1.73M2 — SIGNIFICANT CHANGE UP
EGFR: 115 ML/MIN/1.73M2 — SIGNIFICANT CHANGE UP
EOSINOPHIL # BLD AUTO: 0.3 K/UL — SIGNIFICANT CHANGE UP (ref 0–0.5)
EOSINOPHIL NFR BLD AUTO: 5.1 % — SIGNIFICANT CHANGE UP (ref 0–6)
GLUCOSE SERPL-MCNC: 100 MG/DL — HIGH (ref 70–99)
HCT VFR BLD CALC: 37.6 % — LOW (ref 39–50)
HGB BLD-MCNC: 12.9 G/DL — LOW (ref 13–17)
IMM GRANULOCYTES NFR BLD AUTO: 0.2 % — SIGNIFICANT CHANGE UP (ref 0–0.9)
LYMPHOCYTES # BLD AUTO: 1.76 K/UL — SIGNIFICANT CHANGE UP (ref 1–3.3)
LYMPHOCYTES # BLD AUTO: 30 % — SIGNIFICANT CHANGE UP (ref 13–44)
MAGNESIUM SERPL-MCNC: 1.9 MG/DL — SIGNIFICANT CHANGE UP (ref 1.6–2.6)
MCHC RBC-ENTMCNC: 30.6 PG — SIGNIFICANT CHANGE UP (ref 27–34)
MCHC RBC-ENTMCNC: 34.3 G/DL — SIGNIFICANT CHANGE UP (ref 32–36)
MCV RBC AUTO: 89.3 FL — SIGNIFICANT CHANGE UP (ref 80–100)
MONOCYTES # BLD AUTO: 0.57 K/UL — SIGNIFICANT CHANGE UP (ref 0–0.9)
MONOCYTES NFR BLD AUTO: 9.7 % — SIGNIFICANT CHANGE UP (ref 2–14)
NEUTROPHILS # BLD AUTO: 3.17 K/UL — SIGNIFICANT CHANGE UP (ref 1.8–7.4)
NEUTROPHILS NFR BLD AUTO: 54.1 % — SIGNIFICANT CHANGE UP (ref 43–77)
NRBC BLD AUTO-RTO: 0 /100 WBCS — SIGNIFICANT CHANGE UP (ref 0–0)
PHOSPHATE SERPL-MCNC: 4.2 MG/DL — SIGNIFICANT CHANGE UP (ref 2.5–4.5)
PLATELET # BLD AUTO: 175 K/UL — SIGNIFICANT CHANGE UP (ref 150–400)
POTASSIUM SERPL-MCNC: 4.1 MMOL/L — SIGNIFICANT CHANGE UP (ref 3.5–5.3)
POTASSIUM SERPL-SCNC: 4.1 MMOL/L — SIGNIFICANT CHANGE UP (ref 3.5–5.3)
RBC # BLD: 4.21 M/UL — SIGNIFICANT CHANGE UP (ref 4.2–5.8)
RBC # FLD: 12.3 % — SIGNIFICANT CHANGE UP (ref 10.3–14.5)
SODIUM SERPL-SCNC: 141 MMOL/L — SIGNIFICANT CHANGE UP (ref 135–145)
WBC # BLD: 5.86 K/UL — SIGNIFICANT CHANGE UP (ref 3.8–10.5)
WBC # FLD AUTO: 5.86 K/UL — SIGNIFICANT CHANGE UP (ref 3.8–10.5)

## 2025-05-31 PROCEDURE — 87040 BLOOD CULTURE FOR BACTERIA: CPT

## 2025-05-31 PROCEDURE — 87070 CULTURE OTHR SPECIMN AEROBIC: CPT

## 2025-05-31 PROCEDURE — 86788 WEST NILE VIRUS AB IGM: CPT

## 2025-05-31 PROCEDURE — 87205 SMEAR GRAM STAIN: CPT

## 2025-05-31 PROCEDURE — 86789 WEST NILE VIRUS ANTIBODY: CPT

## 2025-05-31 PROCEDURE — 70553 MRI BRAIN STEM W/O & W/DYE: CPT

## 2025-05-31 PROCEDURE — A9585: CPT

## 2025-05-31 PROCEDURE — 36415 COLL VENOUS BLD VENIPUNCTURE: CPT

## 2025-05-31 PROCEDURE — 93005 ELECTROCARDIOGRAM TRACING: CPT

## 2025-05-31 PROCEDURE — 80053 COMPREHEN METABOLIC PANEL: CPT

## 2025-05-31 PROCEDURE — 96374 THER/PROPH/DIAG INJ IV PUSH: CPT

## 2025-05-31 PROCEDURE — 83605 ASSAY OF LACTIC ACID: CPT

## 2025-05-31 PROCEDURE — 96375 TX/PRO/DX INJ NEW DRUG ADDON: CPT

## 2025-05-31 PROCEDURE — 99239 HOSP IP/OBS DSCHRG MGMT >30: CPT

## 2025-05-31 PROCEDURE — 99285 EMERGENCY DEPT VISIT HI MDM: CPT | Mod: 25

## 2025-05-31 PROCEDURE — 84157 ASSAY OF PROTEIN OTHER: CPT

## 2025-05-31 PROCEDURE — 86703 HIV-1/HIV-2 1 RESULT ANTBDY: CPT

## 2025-05-31 PROCEDURE — 87529 HSV DNA AMP PROBE: CPT

## 2025-05-31 PROCEDURE — 83735 ASSAY OF MAGNESIUM: CPT

## 2025-05-31 PROCEDURE — 89051 BODY FLUID CELL COUNT: CPT

## 2025-05-31 PROCEDURE — 84100 ASSAY OF PHOSPHORUS: CPT

## 2025-05-31 PROCEDURE — 87798 DETECT AGENT NOS DNA AMP: CPT

## 2025-05-31 PROCEDURE — 87015 SPECIMEN INFECT AGNT CONCNTJ: CPT

## 2025-05-31 PROCEDURE — 82945 GLUCOSE OTHER FLUID: CPT

## 2025-05-31 PROCEDURE — 85025 COMPLETE CBC W/AUTO DIFF WBC: CPT

## 2025-05-31 PROCEDURE — 80048 BASIC METABOLIC PNL TOTAL CA: CPT

## 2025-05-31 PROCEDURE — 87483 CNS DNA AMP PROBE TYPE 12-25: CPT

## 2025-05-31 PROCEDURE — 70450 CT HEAD/BRAIN W/O DYE: CPT

## 2025-05-31 PROCEDURE — 36573 INSJ PICC RS&I 5 YR+: CPT

## 2025-05-31 RX ADMIN — DIAZEPAM 2 MILLIGRAM(S): 5 TABLET ORAL at 06:08

## 2025-05-31 RX ADMIN — Medication 266 MILLIGRAM(S): at 15:11

## 2025-05-31 RX ADMIN — Medication 650 MILLIGRAM(S): at 12:00

## 2025-05-31 RX ADMIN — Medication 1 APPLICATION(S): at 06:08

## 2025-05-31 RX ADMIN — Medication 266 MILLIGRAM(S): at 01:18

## 2025-05-31 RX ADMIN — Medication 100 MILLILITER(S): at 03:55

## 2025-05-31 RX ADMIN — Medication 266 MILLIGRAM(S): at 08:57

## 2025-05-31 RX ADMIN — DIAZEPAM 2 MILLIGRAM(S): 5 TABLET ORAL at 12:43

## 2025-05-31 RX ADMIN — Medication 650 MILLIGRAM(S): at 11:00

## 2025-06-02 LAB
CULTURE RESULTS: SIGNIFICANT CHANGE UP
SPECIMEN SOURCE: SIGNIFICANT CHANGE UP

## 2025-06-03 LAB
CULTURE RESULTS: SIGNIFICANT CHANGE UP
CULTURE RESULTS: SIGNIFICANT CHANGE UP
SPECIMEN SOURCE: SIGNIFICANT CHANGE UP
SPECIMEN SOURCE: SIGNIFICANT CHANGE UP

## 2025-06-04 ENCOUNTER — EMERGENCY (EMERGENCY)
Age: 30
LOS: 1 days | End: 2025-06-04
Attending: EMERGENCY MEDICINE | Admitting: EMERGENCY MEDICINE
Payer: COMMERCIAL

## 2025-06-04 VITALS
SYSTOLIC BLOOD PRESSURE: 127 MMHG | RESPIRATION RATE: 16 BRPM | OXYGEN SATURATION: 98 % | HEART RATE: 66 BPM | TEMPERATURE: 98 F | DIASTOLIC BLOOD PRESSURE: 78 MMHG

## 2025-06-04 VITALS
OXYGEN SATURATION: 98 % | DIASTOLIC BLOOD PRESSURE: 103 MMHG | SYSTOLIC BLOOD PRESSURE: 156 MMHG | WEIGHT: 179.9 LBS | HEART RATE: 91 BPM | RESPIRATION RATE: 18 BRPM | TEMPERATURE: 99 F | HEIGHT: 72 IN

## 2025-06-04 DIAGNOSIS — B34.9 VIRAL INFECTION, UNSPECIFIED: ICD-10-CM

## 2025-06-04 DIAGNOSIS — H57.89 OTHER SPECIFIED DISORDERS OF EYE AND ADNEXA: ICD-10-CM

## 2025-06-04 DIAGNOSIS — H53.141 VISUAL DISCOMFORT, RIGHT EYE: ICD-10-CM

## 2025-06-04 DIAGNOSIS — R05.9 COUGH, UNSPECIFIED: ICD-10-CM

## 2025-06-04 LAB
ALBUMIN SERPL ELPH-MCNC: 4.5 G/DL — SIGNIFICANT CHANGE UP (ref 3.4–5)
ALP SERPL-CCNC: 69 U/L — SIGNIFICANT CHANGE UP (ref 40–120)
ALT FLD-CCNC: 26 U/L — SIGNIFICANT CHANGE UP (ref 12–42)
ANION GAP SERPL CALC-SCNC: 6 MMOL/L — LOW (ref 9–16)
AST SERPL-CCNC: 21 U/L — SIGNIFICANT CHANGE UP (ref 15–37)
BASOPHILS # BLD AUTO: 0.04 K/UL — SIGNIFICANT CHANGE UP (ref 0–0.2)
BASOPHILS NFR BLD AUTO: 0.7 % — SIGNIFICANT CHANGE UP (ref 0–2)
BILIRUB SERPL-MCNC: 0.8 MG/DL — SIGNIFICANT CHANGE UP (ref 0.2–1.2)
BUN SERPL-MCNC: 14 MG/DL — SIGNIFICANT CHANGE UP (ref 7–23)
CALCIUM SERPL-MCNC: 9.5 MG/DL — SIGNIFICANT CHANGE UP (ref 8.5–10.5)
CHLORIDE SERPL-SCNC: 102 MMOL/L — SIGNIFICANT CHANGE UP (ref 96–108)
CO2 SERPL-SCNC: 31 MMOL/L — SIGNIFICANT CHANGE UP (ref 22–31)
CREAT SERPL-MCNC: 1.1 MG/DL — SIGNIFICANT CHANGE UP (ref 0.5–1.3)
EGFR: 93 ML/MIN/1.73M2 — SIGNIFICANT CHANGE UP
EGFR: 93 ML/MIN/1.73M2 — SIGNIFICANT CHANGE UP
EOSINOPHIL # BLD AUTO: 0.29 K/UL — SIGNIFICANT CHANGE UP (ref 0–0.5)
EOSINOPHIL NFR BLD AUTO: 5.1 % — SIGNIFICANT CHANGE UP (ref 0–6)
GLUCOSE SERPL-MCNC: 92 MG/DL — SIGNIFICANT CHANGE UP (ref 70–99)
HCT VFR BLD CALC: 38.6 % — LOW (ref 39–50)
HGB BLD-MCNC: 12.9 G/DL — LOW (ref 13–17)
IMM GRANULOCYTES # BLD AUTO: 0.01 K/UL — SIGNIFICANT CHANGE UP (ref 0–0.07)
IMM GRANULOCYTES NFR BLD AUTO: 0.2 % — SIGNIFICANT CHANGE UP (ref 0–0.9)
LYMPHOCYTES # BLD AUTO: 1.5 K/UL — SIGNIFICANT CHANGE UP (ref 1–3.3)
LYMPHOCYTES NFR BLD AUTO: 26.2 % — SIGNIFICANT CHANGE UP (ref 13–44)
MCHC RBC-ENTMCNC: 29.9 PG — SIGNIFICANT CHANGE UP (ref 27–34)
MCHC RBC-ENTMCNC: 33.4 G/DL — SIGNIFICANT CHANGE UP (ref 32–36)
MCV RBC AUTO: 89.4 FL — SIGNIFICANT CHANGE UP (ref 80–100)
MONOCYTES # BLD AUTO: 0.54 K/UL — SIGNIFICANT CHANGE UP (ref 0–0.9)
MONOCYTES NFR BLD AUTO: 9.4 % — SIGNIFICANT CHANGE UP (ref 2–14)
NEUTROPHILS # BLD AUTO: 3.35 K/UL — SIGNIFICANT CHANGE UP (ref 1.8–7.4)
NEUTROPHILS NFR BLD AUTO: 58.4 % — SIGNIFICANT CHANGE UP (ref 43–77)
NRBC # BLD AUTO: 0 K/UL — SIGNIFICANT CHANGE UP (ref 0–0)
NRBC # FLD: 0 K/UL — SIGNIFICANT CHANGE UP (ref 0–0)
NRBC BLD AUTO-RTO: 0 /100 WBCS — SIGNIFICANT CHANGE UP (ref 0–0)
PLATELET # BLD AUTO: 186 K/UL — SIGNIFICANT CHANGE UP (ref 150–400)
PMV BLD: 9.7 FL — SIGNIFICANT CHANGE UP (ref 7–13)
POTASSIUM SERPL-MCNC: 3.8 MMOL/L — SIGNIFICANT CHANGE UP (ref 3.5–5.3)
POTASSIUM SERPL-SCNC: 3.8 MMOL/L — SIGNIFICANT CHANGE UP (ref 3.5–5.3)
PROT SERPL-MCNC: 7.8 G/DL — SIGNIFICANT CHANGE UP (ref 6.4–8.2)
RBC # BLD: 4.32 M/UL — SIGNIFICANT CHANGE UP (ref 4.2–5.8)
RBC # FLD: 12.3 % — SIGNIFICANT CHANGE UP (ref 10.3–14.5)
SODIUM SERPL-SCNC: 139 MMOL/L — SIGNIFICANT CHANGE UP (ref 132–145)
WBC # BLD: 5.73 K/UL — SIGNIFICANT CHANGE UP (ref 3.8–10.5)
WBC # FLD AUTO: 5.73 K/UL — SIGNIFICANT CHANGE UP (ref 3.8–10.5)

## 2025-06-04 PROCEDURE — 99285 EMERGENCY DEPT VISIT HI MDM: CPT

## 2025-06-04 PROCEDURE — 71046 X-RAY EXAM CHEST 2 VIEWS: CPT | Mod: 26

## 2025-06-04 RX ORDER — KETOROLAC TROMETHAMINE 30 MG/ML
15 INJECTION, SOLUTION INTRAMUSCULAR; INTRAVENOUS ONCE
Refills: 0 | Status: DISCONTINUED | OUTPATIENT
Start: 2025-06-04 | End: 2025-06-04

## 2025-06-06 ENCOUNTER — INPATIENT (INPATIENT)
Facility: HOSPITAL | Age: 30
LOS: 2 days | Discharge: HOME | End: 2025-06-09
Attending: STUDENT IN AN ORGANIZED HEALTH CARE EDUCATION/TRAINING PROGRAM | Admitting: STUDENT IN AN ORGANIZED HEALTH CARE EDUCATION/TRAINING PROGRAM
Payer: COMMERCIAL

## 2025-06-06 VITALS
HEART RATE: 81 BPM | TEMPERATURE: 98 F | DIASTOLIC BLOOD PRESSURE: 87 MMHG | OXYGEN SATURATION: 98 % | SYSTOLIC BLOOD PRESSURE: 128 MMHG | RESPIRATION RATE: 16 BRPM | HEIGHT: 72 IN | WEIGHT: 179.9 LBS

## 2025-06-06 DIAGNOSIS — R47.1 DYSARTHRIA AND ANARTHRIA: ICD-10-CM

## 2025-06-06 DIAGNOSIS — F14.90 COCAINE USE, UNSPECIFIED, UNCOMPLICATED: ICD-10-CM

## 2025-06-06 DIAGNOSIS — R51.9 HEADACHE, UNSPECIFIED: ICD-10-CM

## 2025-06-06 DIAGNOSIS — F41.9 ANXIETY DISORDER, UNSPECIFIED: ICD-10-CM

## 2025-06-06 DIAGNOSIS — M54.2 CERVICALGIA: ICD-10-CM

## 2025-06-06 DIAGNOSIS — R10.9 UNSPECIFIED ABDOMINAL PAIN: ICD-10-CM

## 2025-06-06 DIAGNOSIS — B02.1 ZOSTER MENINGITIS: ICD-10-CM

## 2025-06-06 DIAGNOSIS — F12.90 CANNABIS USE, UNSPECIFIED, UNCOMPLICATED: ICD-10-CM

## 2025-06-06 DIAGNOSIS — R41.3 OTHER AMNESIA: ICD-10-CM

## 2025-06-06 DIAGNOSIS — N17.9 ACUTE KIDNEY FAILURE, UNSPECIFIED: ICD-10-CM

## 2025-06-06 DIAGNOSIS — R26.81 UNSTEADINESS ON FEET: ICD-10-CM

## 2025-06-06 DIAGNOSIS — Z29.9 ENCOUNTER FOR PROPHYLACTIC MEASURES, UNSPECIFIED: ICD-10-CM

## 2025-06-06 DIAGNOSIS — B02.0 ZOSTER ENCEPHALITIS: ICD-10-CM

## 2025-06-06 DIAGNOSIS — F17.210 NICOTINE DEPENDENCE, CIGARETTES, UNCOMPLICATED: ICD-10-CM

## 2025-06-06 LAB
ALBUMIN SERPL ELPH-MCNC: 4.2 G/DL — SIGNIFICANT CHANGE UP (ref 3.4–5)
ALP SERPL-CCNC: 71 U/L — SIGNIFICANT CHANGE UP (ref 40–120)
ALT FLD-CCNC: 25 U/L — SIGNIFICANT CHANGE UP (ref 12–42)
ANION GAP SERPL CALC-SCNC: 12 MMOL/L — SIGNIFICANT CHANGE UP (ref 5–17)
ANION GAP SERPL CALC-SCNC: 8 MMOL/L — LOW (ref 9–16)
ANION GAP SERPL CALC-SCNC: 8 MMOL/L — LOW (ref 9–16)
APPEARANCE UR: CLEAR — SIGNIFICANT CHANGE UP
AST SERPL-CCNC: 20 U/L — SIGNIFICANT CHANGE UP (ref 15–37)
BASOPHILS # BLD AUTO: 0.06 K/UL — SIGNIFICANT CHANGE UP (ref 0–0.2)
BASOPHILS NFR BLD AUTO: 0.6 % — SIGNIFICANT CHANGE UP (ref 0–2)
BILIRUB SERPL-MCNC: 0.4 MG/DL — SIGNIFICANT CHANGE UP (ref 0.2–1.2)
BILIRUB UR-MCNC: NEGATIVE — SIGNIFICANT CHANGE UP
BUN SERPL-MCNC: 19 MG/DL — SIGNIFICANT CHANGE UP (ref 7–23)
BUN SERPL-MCNC: 20 MG/DL — SIGNIFICANT CHANGE UP (ref 7–23)
BUN SERPL-MCNC: 20 MG/DL — SIGNIFICANT CHANGE UP (ref 7–23)
CALCIUM SERPL-MCNC: 8.3 MG/DL — LOW (ref 8.5–10.5)
CALCIUM SERPL-MCNC: 8.4 MG/DL — SIGNIFICANT CHANGE UP (ref 8.4–10.5)
CALCIUM SERPL-MCNC: 9.2 MG/DL — SIGNIFICANT CHANGE UP (ref 8.5–10.5)
CHLORIDE SERPL-SCNC: 103 MMOL/L — SIGNIFICANT CHANGE UP (ref 96–108)
CHLORIDE SERPL-SCNC: 106 MMOL/L — SIGNIFICANT CHANGE UP (ref 96–108)
CHLORIDE SERPL-SCNC: 107 MMOL/L — SIGNIFICANT CHANGE UP (ref 96–108)
CK SERPL-CCNC: 128 U/L — SIGNIFICANT CHANGE UP (ref 39–308)
CO2 SERPL-SCNC: 25 MMOL/L — SIGNIFICANT CHANGE UP (ref 22–31)
CO2 SERPL-SCNC: 26 MMOL/L — SIGNIFICANT CHANGE UP (ref 22–31)
CO2 SERPL-SCNC: 30 MMOL/L — SIGNIFICANT CHANGE UP (ref 22–31)
COLOR SPEC: YELLOW — SIGNIFICANT CHANGE UP
CREAT SERPL-MCNC: 1.92 MG/DL — HIGH (ref 0.5–1.3)
CREAT SERPL-MCNC: 2.13 MG/DL — HIGH (ref 0.5–1.3)
CREAT SERPL-MCNC: 2.48 MG/DL — HIGH (ref 0.5–1.3)
DIFF PNL FLD: ABNORMAL
EGFR: 35 ML/MIN/1.73M2 — LOW
EGFR: 35 ML/MIN/1.73M2 — LOW
EGFR: 42 ML/MIN/1.73M2 — LOW
EGFR: 42 ML/MIN/1.73M2 — LOW
EGFR: 47 ML/MIN/1.73M2 — LOW
EGFR: 47 ML/MIN/1.73M2 — LOW
EOSINOPHIL # BLD AUTO: 0.43 K/UL — SIGNIFICANT CHANGE UP (ref 0–0.5)
EOSINOPHIL NFR BLD AUTO: 4.4 % — SIGNIFICANT CHANGE UP (ref 0–6)
GLUCOSE SERPL-MCNC: 100 MG/DL — HIGH (ref 70–99)
GLUCOSE SERPL-MCNC: 88 MG/DL — SIGNIFICANT CHANGE UP (ref 70–99)
GLUCOSE SERPL-MCNC: 98 MG/DL — SIGNIFICANT CHANGE UP (ref 70–99)
GLUCOSE UR QL: NEGATIVE MG/DL — SIGNIFICANT CHANGE UP
HCT VFR BLD CALC: 37.4 % — LOW (ref 39–50)
HGB BLD-MCNC: 12.5 G/DL — LOW (ref 13–17)
IMM GRANULOCYTES # BLD AUTO: 0.02 K/UL — SIGNIFICANT CHANGE UP (ref 0–0.07)
IMM GRANULOCYTES NFR BLD AUTO: 0.2 % — SIGNIFICANT CHANGE UP (ref 0–0.9)
KETONES UR QL: NEGATIVE MG/DL — SIGNIFICANT CHANGE UP
LACTATE BLDV-MCNC: 1.2 MMOL/L — SIGNIFICANT CHANGE UP (ref 0.5–2)
LEUKOCYTE ESTERASE UR-ACNC: NEGATIVE — SIGNIFICANT CHANGE UP
LIDOCAIN IGE QN: 26 U/L — SIGNIFICANT CHANGE UP (ref 16–77)
LYMPHOCYTES # BLD AUTO: 1.67 K/UL — SIGNIFICANT CHANGE UP (ref 1–3.3)
LYMPHOCYTES NFR BLD AUTO: 17.1 % — SIGNIFICANT CHANGE UP (ref 13–44)
MAGNESIUM SERPL-MCNC: 1.9 MG/DL — SIGNIFICANT CHANGE UP (ref 1.6–2.6)
MCHC RBC-ENTMCNC: 29.9 PG — SIGNIFICANT CHANGE UP (ref 27–34)
MCHC RBC-ENTMCNC: 33.4 G/DL — SIGNIFICANT CHANGE UP (ref 32–36)
MCV RBC AUTO: 89.5 FL — SIGNIFICANT CHANGE UP (ref 80–100)
MONOCYTES # BLD AUTO: 1.13 K/UL — HIGH (ref 0–0.9)
MONOCYTES NFR BLD AUTO: 11.5 % — SIGNIFICANT CHANGE UP (ref 2–14)
NEUTROPHILS # BLD AUTO: 6.48 K/UL — SIGNIFICANT CHANGE UP (ref 1.8–7.4)
NEUTROPHILS NFR BLD AUTO: 66.2 % — SIGNIFICANT CHANGE UP (ref 43–77)
NITRITE UR-MCNC: NEGATIVE — SIGNIFICANT CHANGE UP
NRBC # BLD AUTO: 0 K/UL — SIGNIFICANT CHANGE UP (ref 0–0)
NRBC # FLD: 0 K/UL — SIGNIFICANT CHANGE UP (ref 0–0)
NRBC BLD AUTO-RTO: 0 /100 WBCS — SIGNIFICANT CHANGE UP (ref 0–0)
PCO2 BLDV: 44 MMHG — SIGNIFICANT CHANGE UP (ref 42–55)
PH BLDV: 7.49 — HIGH (ref 7.32–7.43)
PH UR: 7.5 — SIGNIFICANT CHANGE UP (ref 5–8)
PLATELET # BLD AUTO: 195 K/UL — SIGNIFICANT CHANGE UP (ref 150–400)
PMV BLD: 9.6 FL — SIGNIFICANT CHANGE UP (ref 7–13)
PO2 BLDV: <35 MMHG — SIGNIFICANT CHANGE UP (ref 25–45)
POTASSIUM SERPL-MCNC: 4 MMOL/L — SIGNIFICANT CHANGE UP (ref 3.5–5.3)
POTASSIUM SERPL-MCNC: 4.2 MMOL/L — SIGNIFICANT CHANGE UP (ref 3.5–5.3)
POTASSIUM SERPL-MCNC: 4.4 MMOL/L — SIGNIFICANT CHANGE UP (ref 3.5–5.3)
POTASSIUM SERPL-SCNC: 4 MMOL/L — SIGNIFICANT CHANGE UP (ref 3.5–5.3)
POTASSIUM SERPL-SCNC: 4.2 MMOL/L — SIGNIFICANT CHANGE UP (ref 3.5–5.3)
POTASSIUM SERPL-SCNC: 4.4 MMOL/L — SIGNIFICANT CHANGE UP (ref 3.5–5.3)
PROT SERPL-MCNC: 7.3 G/DL — SIGNIFICANT CHANGE UP (ref 6.4–8.2)
PROT UR-MCNC: 100 MG/DL
RBC # BLD: 4.18 M/UL — LOW (ref 4.2–5.8)
RBC # FLD: 12.3 % — SIGNIFICANT CHANGE UP (ref 10.3–14.5)
SAO2 % BLDV: 36.3 % — LOW (ref 67–88)
SODIUM SERPL-SCNC: 141 MMOL/L — SIGNIFICANT CHANGE UP (ref 132–145)
SODIUM SERPL-SCNC: 141 MMOL/L — SIGNIFICANT CHANGE UP (ref 132–145)
SODIUM SERPL-SCNC: 143 MMOL/L — SIGNIFICANT CHANGE UP (ref 135–145)
SP GR SPEC: 1 — SIGNIFICANT CHANGE UP (ref 1–1.03)
UROBILINOGEN FLD QL: 0.2 MG/DL — SIGNIFICANT CHANGE UP (ref 0.2–1)
WBC # BLD: 9.79 K/UL — SIGNIFICANT CHANGE UP (ref 3.8–10.5)
WBC # FLD AUTO: 9.79 K/UL — SIGNIFICANT CHANGE UP (ref 3.8–10.5)

## 2025-06-06 PROCEDURE — 74176 CT ABD & PELVIS W/O CONTRAST: CPT | Mod: 26

## 2025-06-06 PROCEDURE — 99223 1ST HOSP IP/OBS HIGH 75: CPT | Mod: GC

## 2025-06-06 PROCEDURE — 99285 EMERGENCY DEPT VISIT HI MDM: CPT

## 2025-06-06 RX ORDER — DIAZEPAM 5 MG/1
1 TABLET ORAL
Refills: 0 | DISCHARGE

## 2025-06-06 RX ORDER — ACETAMINOPHEN 500 MG/5ML
650 LIQUID (ML) ORAL EVERY 6 HOURS
Refills: 0 | Status: DISCONTINUED | OUTPATIENT
Start: 2025-06-06 | End: 2025-06-09

## 2025-06-06 RX ORDER — ACETAMINOPHEN 500 MG/5ML
1000 LIQUID (ML) ORAL ONCE
Refills: 0 | Status: COMPLETED | OUTPATIENT
Start: 2025-06-06 | End: 2025-06-06

## 2025-06-06 RX ORDER — HYDROMORPHONE/SOD CHLOR,ISO/PF 2 MG/10 ML
2 SYRINGE (ML) INJECTION EVERY 4 HOURS
Refills: 0 | Status: DISCONTINUED | OUTPATIENT
Start: 2025-06-06 | End: 2025-06-09

## 2025-06-06 RX ORDER — MELATONIN 5 MG
3 TABLET ORAL AT BEDTIME
Refills: 0 | Status: DISCONTINUED | OUTPATIENT
Start: 2025-06-06 | End: 2025-06-09

## 2025-06-06 RX ORDER — MAGNESIUM, ALUMINUM HYDROXIDE 200-200 MG
30 TABLET,CHEWABLE ORAL EVERY 4 HOURS
Refills: 0 | Status: DISCONTINUED | OUTPATIENT
Start: 2025-06-06 | End: 2025-06-09

## 2025-06-06 RX ORDER — HYDROMORPHONE/SOD CHLOR,ISO/PF 2 MG/10 ML
0.5 SYRINGE (ML) INJECTION ONCE
Refills: 0 | Status: DISCONTINUED | OUTPATIENT
Start: 2025-06-06 | End: 2025-06-06

## 2025-06-06 RX ORDER — KETOROLAC TROMETHAMINE 30 MG/ML
15 INJECTION, SOLUTION INTRAMUSCULAR; INTRAVENOUS ONCE
Refills: 0 | Status: DISCONTINUED | OUTPATIENT
Start: 2025-06-06 | End: 2025-06-06

## 2025-06-06 RX ORDER — HYDROMORPHONE/SOD CHLOR,ISO/PF 2 MG/10 ML
4 SYRINGE (ML) INJECTION EVERY 4 HOURS
Refills: 0 | Status: DISCONTINUED | OUTPATIENT
Start: 2025-06-06 | End: 2025-06-09

## 2025-06-06 RX ORDER — DIAZEPAM 5 MG/1
2 TABLET ORAL EVERY 12 HOURS
Refills: 0 | Status: DISCONTINUED | OUTPATIENT
Start: 2025-06-06 | End: 2025-06-09

## 2025-06-06 RX ORDER — ONDANSETRON HCL/PF 4 MG/2 ML
4 VIAL (ML) INJECTION EVERY 8 HOURS
Refills: 0 | Status: DISCONTINUED | OUTPATIENT
Start: 2025-06-06 | End: 2025-06-09

## 2025-06-06 RX ADMIN — Medication 250 MILLILITER(S): at 13:34

## 2025-06-06 RX ADMIN — Medication 4 MILLIGRAM(S): at 06:37

## 2025-06-06 RX ADMIN — Medication 4 MILLIGRAM(S): at 23:03

## 2025-06-06 RX ADMIN — Medication 1000 MILLILITER(S): at 06:25

## 2025-06-06 RX ADMIN — Medication 400 MILLIGRAM(S): at 06:11

## 2025-06-06 RX ADMIN — Medication 650 MILLIGRAM(S): at 23:03

## 2025-06-06 RX ADMIN — Medication 0.5 MILLIGRAM(S): at 13:34

## 2025-06-06 RX ADMIN — DIAZEPAM 2 MILLIGRAM(S): 5 TABLET ORAL at 21:50

## 2025-06-06 RX ADMIN — Medication 125 MILLILITER(S): at 21:51

## 2025-06-06 RX ADMIN — Medication 4 MILLIGRAM(S): at 06:21

## 2025-06-06 RX ADMIN — Medication 1000 MILLILITER(S): at 08:30

## 2025-06-06 RX ADMIN — KETOROLAC TROMETHAMINE 15 MILLIGRAM(S): 30 INJECTION, SOLUTION INTRAMUSCULAR; INTRAVENOUS at 06:37

## 2025-06-06 RX ADMIN — KETOROLAC TROMETHAMINE 15 MILLIGRAM(S): 30 INJECTION, SOLUTION INTRAMUSCULAR; INTRAVENOUS at 06:11

## 2025-06-06 NOTE — ED PROVIDER NOTE - CARE PROVIDER_API CALL
Bert Malik  Infectious Disease  [address] [place] [address]  Phone: [phone]  Fax: [phone]  Follow Up Time:

## 2025-06-06 NOTE — H&P ADULT - PROBLEM SELECTOR PLAN 2
On admission Cr: 1.92 ->2.13 (baseline 0.9-1.1).  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  - Obtain bladder scan to rule out obstruction  - f/u renal U/S  - Continue to trend Cr, 10pm bmp  - Avoid nephrotoxic agents as able  - Adjust medication dosages for level of renal function  - 125cc/hr NS, strict ins and outs  - rest of plan as above On admission Cr: 1.92 ->2.13 (baseline 0.9-1.1).  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  - Obtain bladder scan to rule out obstruction  - renal U/S  - Continue to trend Cr, 10pm bmp  - Avoid nephrotoxic agents as able  - Adjust medication dosages for level of renal function  - 125cc/hr NS, strict ins and outs  - rest of plan as above On admission Cr: 1.92 ->2.13 (baseline 0.9-1.1).  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  - Obtain bladder scan to rule out obstruction  - renal U/S  - hold IV acyclovir 800mg q8hr x 14 days (last day June 11th) overnight, pending discussion with ID and nephro  - Continue to trend Cr, 10pm bmp  - Avoid nephrotoxic agents as able  - Adjust medication dosages for level of renal function  - 125cc/hr NS, strict ins and outs  - rest of plan as above Previous Results:  -> LP 5/28: 1 RBC, 0 PMNs, Gram stain negative no organisms  -> CSF PCR from 5/28 LP: Negative  -> CTH non-con 5/28: Within Normal Limits  -> BCx 5/28: NG5D x 2 sets  -> VZV swab 5/29: negative  -> MRI brain with IV contrast 5/30: WNL  Denies headache, alert oriented.   - of note did clinically improve with starting empiric varicella treatment  - hold IV acyclovir 800mg q8hr x 14 days (last day June 11th) overnight, pending discussion with ID and nephro  - given rise in creatinine will need to continue aggressive hydration, s/p 3L NS in ED, start 125cc/hr NS  - goal urine output above 75 mL/hour  - complete recovery typically occurs within four to nine days after acyclovir is discontinued, will aggressively hydrate for now. Will need an interdisciplinary discussion on benefit of continued acyclovir pending BMP trend  - airborne and contact precautions  - ID and nephro consult in am

## 2025-06-06 NOTE — PATIENT PROFILE ADULT - FALL HARM RISK - HARM RISK INTERVENTIONS

## 2025-06-06 NOTE — H&P ADULT - PROBLEM SELECTOR PLAN 5
F: 125cc/hr NS  E: Replete as needed  N: Regular diet  Dvt ppx: low improve, scds  GI ppx: ni  Activity: full  Code status: full

## 2025-06-06 NOTE — ED PROVIDER NOTE - PROGRESS NOTE DETAILS
Signed out to Dr. Rodas pending final CT read. Signed out to Dr. Rodas pending final CT read. patient is also pending repeat creatinine, and another liter of fluids with ua still pending as well. patient is aware of plan. dr jiménez to be contacted from ID to determine if should continue acyclovir with elevated creatinine

## 2025-06-06 NOTE — H&P ADULT - PROBLEM SELECTOR PLAN 4
Known, multi year history of anxiety.  Initially diagnosed _______.  Follows with Psych? SI/HI? Mood on exam?  Home medications: diazepam 2mg BID  - c home medications  - blinds up daily Known, multi year history of anxiety.  Denies SI/HI. Mood on exam appropriate.  Home medications: diazepam 2mg BID  - c home medications  - blinds up daily

## 2025-06-06 NOTE — ED ADULT TRIAGE NOTE - ESI TRIAGE ACUITY LEVEL, MLM
[FreeTextEntry1] : 58 y/o P1 presents for annual GYN exam. \par \par , but not sexually active for many years.\par \par PM since age 49. No PMB.\par \par No GYN complaints today.\par \par Pap 3/10/21 NILM/HPV-\par \par Mammogram 8/3/21 BI-RADS 2\par \par Colonoscopy 2021 reports normal with 5 year recall.\par \par Never had bone density.\par \par FH of colon cancer.\par \par Denies FH of ca of ovary, breast or uterus.\par \par Stays at home. Helps her elderly mother.\par \par Daughter studying Biology in [employer].
2

## 2025-06-06 NOTE — H&P ADULT - NSHPPHYSICALEXAM_GEN_ALL_CORE
Constitutional: NAD  HEENT: NC/AT, PERRL/EOMI, anicteric sclera, No JVD or thyromegaly, MMM  Respiratory: CTA B/L; no audible wheezing/ronchi/rales  Cardiac: +S1/S2; RRR; no M/R/G  Gastrointestinal: soft, NT/ND; no rebound or guarding; +BS  Back: spine midline, no bony tenderness or step-offs; no CVAT B/L  Extremities: WWP, no clubbing or cyanosis; no peripheral edema  Vascular: 2+ radial & DP pulses  Dermatologic: skin warm, dry and intact; no rashes, wounds, or scars  Neurologic: AAOx3; CNII-XII grossly intact; no focal deficits  Psychiatric: affect and characteristics of appearance, verbalizations, behaviors are appropriate Constitutional: mild distress,   HEENT: NC/AT, PERRL/EOMI, anicteric sclera, No JVD, mildly dry MM  Respiratory: CTA B/L; no audible wheezing/rhonchi/rales  Cardiac: RRR; no M/R/G  Gastrointestinal: soft, ND; no rebound or guarding; +BS, pain on manipulation to the b/l flanks  Back: spine midline, no bony tenderness or step-offs; no CVAT B/L  Extremities: WWP, no clubbing or cyanosis; no peripheral edema  Vascular: 2+ radial & DP pulses  Dermatologic: skin warm, dry and intact; no rashes, wounds, or scars  Neurologic: AAOx3; CNII-XII grossly intact; no focal deficits  Psychiatric: affect and characteristics of appearance, verbalizations, behaviors are appropriate

## 2025-06-06 NOTE — H&P ADULT - NSHPLABSRESULTS_GEN_ALL_CORE
12.5   9.79  )-----------( 195      ( 2025 06:03 )             37.4       -    141  |  107  |  20  ----------------------------<  100[H]  4.4   |  26  |  2.13[H]    Ca    8.3[L]      2025 08:24  Mg     1.9         TPro  7.3  /  Alb  4.2  /  TBili  0.4  /  DBili  x   /  AST  20  /  ALT  25  /  AlkPhos  71  -         CAPILLARY BLOOD GLUCOSE      Lactate Trend      Urinalysis Basic - ( 2025 10:49 )    Color: Yellow / Appearance: Clear / S.005 / pH: x  Gluc: x / Ketone: x  / Bili: Negative / Urobili: 0.2 mg/dL   Blood: x / Protein: 100 mg/dL / Nitrite: Negative   Leuk Esterase: Negative / RBC: 2 /HPF / WBC 0 /HPF   Sq Epi: x / Non Sq Epi: x / Bacteria: Occasional /HPF      Culture Results:   No growth at 5 days ( @ 18:05)  Culture Results:   No growth at 5 days ( @ 15:36)  Culture Results:   No growth at 5 days ( @ 15:36)      RADIOLOGY, EKG & ADDITIONAL TESTS: Reviewed.

## 2025-06-06 NOTE — ED ADULT TRIAGE NOTE - PATIENT'S PREFERRED PRONOUN
Is her upcoming US in our department or radiology? It needs to be done in our department - I added an order if it needs to be changed.  RTC 1 year
24-Apr-2019
Him/He

## 2025-06-06 NOTE — H&P ADULT - PROBLEM SELECTOR PLAN 1
Previous Results:  -> LP 5/28: 1 RBC, 0 PMNs, Gram stain negative no organisms  -> CSF PCR from 5/28 LP: Negative  -> CTH non-con 5/28: Within Normal Limits  -> BCx 5/28: NG5D x 2 sets  -> VZV swab 5/29: negative  -> MRI brain with IV contrast 5/30: WNL  Denies headache, alert oriented.   - of note did clinically improve with starting empiric varicella treatment  - c IV acyclovir 800mg q8hr x 14 days (last day June 11th), crcl 59 in discussion with pharmacy no need to dose reduce  - given rise in creatinine will need to continue aggressive hydration, s/p 3L NS in ED, start 125cc/hr NS  - goal urine output above 75 mL/hour  - complete recovery typically occurs within four to nine days after acyclovir is discontinued Previous Results:  -> LP 5/28: 1 RBC, 0 PMNs, Gram stain negative no organisms  -> CSF PCR from 5/28 LP: Negative  -> CTH non-con 5/28: Within Normal Limits  -> BCx 5/28: NG5D x 2 sets  -> VZV swab 5/29: negative  -> MRI brain with IV contrast 5/30: WNL  Denies headache, alert oriented.   - of note did clinically improve with starting empiric varicella treatment  - c IV acyclovir 800mg q8hr x 14 days (last day June 11th), crcl 59 in discussion with pharmacy no need to dose reduce  - given rise in creatinine will need to continue aggressive hydration, s/p 3L NS in ED, start 125cc/hr NS  - goal urine output above 75 mL/hour  - complete recovery typically occurs within four to nine days after acyclovir is discontinued, will aggressively hydrate for now. Will need a interdisciplinary discussion on benefit of continued acyclovir pending BMP trend Previous Results:  -> LP 5/28: 1 RBC, 0 PMNs, Gram stain negative no organisms  -> CSF PCR from 5/28 LP: Negative  -> CTH non-con 5/28: Within Normal Limits  -> BCx 5/28: NG5D x 2 sets  -> VZV swab 5/29: negative  -> MRI brain with IV contrast 5/30: WNL  Denies headache, alert oriented.   - of note did clinically improve with starting empiric varicella treatment  - c IV acyclovir 800mg q8hr x 14 days (last day June 11th), crcl 59 in discussion with pharmacy no need to dose reduce  - given rise in creatinine will need to continue aggressive hydration, s/p 3L NS in ED, start 125cc/hr NS  - goal urine output above 75 mL/hour  - complete recovery typically occurs within four to nine days after acyclovir is discontinued, will aggressively hydrate for now. Will need an interdisciplinary discussion on benefit of continued acyclovir pending BMP trend Previous Results:  -> LP 5/28: 1 RBC, 0 PMNs, Gram stain negative no organisms  -> CSF PCR from 5/28 LP: Negative  -> CTH non-con 5/28: Within Normal Limits  -> BCx 5/28: NG5D x 2 sets  -> VZV swab 5/29: negative  -> MRI brain with IV contrast 5/30: WNL  Denies headache, alert oriented.   - of note did clinically improve with starting empiric varicella treatment  - c IV acyclovir 800mg q8hr x 14 days (last day June 11th), crcl 59 in discussion with pharmacy no need to dose reduce  - given rise in creatinine will need to continue aggressive hydration, s/p 3L NS in ED, start 125cc/hr NS  - goal urine output above 75 mL/hour  - complete recovery typically occurs within four to nine days after acyclovir is discontinued, will aggressively hydrate for now. Will need an interdisciplinary discussion on benefit of continued acyclovir pending BMP trend  - airborne and contact precautions Previous Results:  -> LP 5/28: 1 RBC, 0 PMNs, Gram stain negative no organisms  -> CSF PCR from 5/28 LP: Negative  -> CTH non-con 5/28: Within Normal Limits  -> BCx 5/28: NG5D x 2 sets  -> VZV swab 5/29: negative  -> MRI brain with IV contrast 5/30: WNL  Denies headache, alert oriented.   - of note did clinically improve with starting empiric varicella treatment  - hold IV acyclovir 800mg q8hr x 14 days (last day June 11th) overnight, pending discussion with ID and nephro  - given rise in creatinine will need to continue aggressive hydration, s/p 3L NS in ED, start 125cc/hr NS  - goal urine output above 75 mL/hour  - complete recovery typically occurs within four to nine days after acyclovir is discontinued, will aggressively hydrate for now. Will need an interdisciplinary discussion on benefit of continued acyclovir pending BMP trend  - airborne and contact precautions  - ID and nephro consult in am On admission Cr: 1.92 ->2.13 (baseline 0.9-1.1).  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  - Obtain bladder scan to rule out obstruction  - renal U/S  - hold IV acyclovir 800mg q8hr x 14 days (last day June 11th) overnight, pending discussion with ID and nephro  - Continue to trend Cr, 10pm bmp  - Avoid nephrotoxic agents as able  - Adjust medication dosages for level of renal function  - 125cc/hr NS, strict ins and outs  - rest of plan as above

## 2025-06-06 NOTE — H&P ADULT - ASSESSMENT
29y M w/ a PMHx of anxiety and recent admission for likely varicella meningitis/herpes zoster R V1 dermatome started on IV acyclovir via picc and discharged, now returning for flank pain and a rise in creatinine.  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

## 2025-06-06 NOTE — H&P ADULT - HISTORY OF PRESENT ILLNESS
PCP:   Pharmacy:   - - - - -    HPI:   29y M w/ PMHx of anxiety, recently dx herpes zoster in R V1 dermatome, recently admitted for viral meningitis, and discharged 2 days ago with picc line in place for acyclovir, who is now presenting with an acute onset of left sided flank pain, severe, sharp, radiating into Left side and left lower quadrant. Denies testicular pain hematuria or dysuria, fever or chills nausea vomiting or diarrhea.  Denies prior history of kidney stone.  Denies recent trauma.  Denies headache or midline spinal pain.    Back on 5/28/25, he presented with multiple days of confusion, headache, ataxia and speech difficulties, noted to have Right sided scalp lesions concerning for zoster. Initially he was discharged with valtrex.  However noted no improvement and continued to have above neurological findings.  Returned on 5/29 and admitted, underwent an LP in the ER and PCR was negative.  MRI Brain on 5/29 was negative for acute infectious process or malignancy. He was continued on acyclovir 10 mg/kg IV every 8 hours and ID was consulted. ID recommended to continue IV acyclovir 800mg every 8 hours x 14 days (last day June 11th) and to place a PICC for discharge, as well as encouraged adequate IV hydration.     [place], blood cultures, CSF cultures all negative from prior admission.     In the ED:  Initial vital signs: T: 97.5F, HR: 81, BP: 128/87, R: 16, SpO2: 98% on RA  Labs: significant for WB 9.79, Hgb 12.5, Hct 37.4, MCV 89.5. Na 141, K 4.0, Cl 103, BUN 19, Cr 1.92 -> 2.13, eGFR 47-?42. Mag 1.9. Venous lactate 1.2, pH 7.49, pCO2 44. UA occasional bacteria.     Imaging:  CT stone hunt: 1.  No calculi or hydronephrosis.2.  Unchanged splenomegaly.  Medications: acetaminophen 1g IV, dilaudid 0.5 mg x1, toradol 15mg x2, morphine 4mg x2, NS 3L  Consults: ID    HPI:   29y M w/ PMHx of anxiety, recently dx herpes zoster in R V1 dermatome, recently admitted for viral meningitis, and discharged 2 days ago with picc line in place for acyclovir, who is now presenting with an acute onset of left sided flank pain, severe, sharp, radiating into Left side and left lower quadrant. Denies testicular pain hematuria or dysuria, fever or chills nausea vomiting or diarrhea.  Denies prior history of kidney stone.  Denies recent trauma.  Denies headache or midline spinal pain.    Back on 5/28/25, he presented with multiple days of confusion, headache, ataxia and speech difficulties, noted to have Right sided scalp lesions concerning for zoster. Initially he was discharged with valtrex.  However noted no improvement and continued to have above neurological findings.  Returned on 5/29 and admitted, underwent an LP in the ER and PCR was negative.  MRI Brain on 5/29 was negative for acute infectious process or malignancy. He was continued on acyclovir 10 mg/kg IV every 8 hours and ID was consulted. ID recommended to continue IV acyclovir 800mg every 8 hours x 14 days (last day June 11th) and to place a PICC for discharge, as well as encouraged adequate IV hydration.     [place], blood cultures, CSF cultures all negative from prior admission.     In the ED:  Initial vital signs: T: 97.5F, HR: 81, BP: 128/87, R: 16, SpO2: 98% on RA  Labs: significant for WB 9.79, Hgb 12.5, Hct 37.4, MCV 89.5. Na 141, K 4.0, Cl 103, BUN 19, Cr 1.92 -> 2.13, eGFR 47-?42. Mag 1.9. Venous lactate 1.2, pH 7.49, pCO2 44. UA occasional bacteria.     Imaging:  CT stone hunt: 1.  No calculi or hydronephrosis.2.  Unchanged splenomegaly.  Medications: acetaminophen 1g IV, dilaudid 0.5 mg x1, toradol 15mg x2, morphine 4mg x2, NS 3L  Consults: ID

## 2025-06-06 NOTE — ED ADULT TRIAGE NOTE - CHIEF COMPLAINT QUOTE
Pt walked in c/o sudden bilateral flank pain starting this morning. Pt states has been receiving IV acyclovir q8 hours for viral meningitis. States took perocet this morning without relief.

## 2025-06-06 NOTE — ED ADULT NURSE REASSESSMENT NOTE - NS ED NURSE REASSESS COMMENT FT1
Assumed care of Pt from night RN. Pt resting in stretcher. Upon entry, Pt endorses nausea, Pt vomited once. Vital signs assessed and stable at this time. 1L NS fluid started. No additional requests or complaints. Patient safety maintained. Will continue to monitor.

## 2025-06-06 NOTE — ED PROVIDER NOTE - OBJECTIVE STATEMENT
29y M w/ no significant PMHx, recently dx herpes zoster in R V1 dermatome, recently admitted for viral meningitis, and discharged 2 days ago with picc line in place for acyclovir, presents with an acute onset of L flank pain, severe, sharp, radiating into L side and LLQ. Denies testicular pain hematuria or dysuria, fever or chills nausea vomiting or diarrhea.  Denies prior history of kidney stone.  Denies recent trauma.  Denies headache or midline spinal pain.

## 2025-06-06 NOTE — H&P ADULT - ATTENDING COMMENTS
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

## 2025-06-06 NOTE — H&P ADULT - PROBLEM SELECTOR PLAN 3
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   Surgical abdomen? Gas? Obstruction? Gallstone/pancreas related? Do not miss: acute abdomen: AAA rupture, bowel perforation,  ascending cholangitis, acute appendicitis, mesenteric ischemia, incarcerated hernia  (happens every once in a while) -myocardial infarction -shock (hypovolemia or  sepsis),spontaneous bacterial peritonitis  Red flags: sudden, severe, fever, hypotension  CT A/P without acute findings which is reassuring.   - serial abdominal exams  - Immediate portable abdominal XR,   - abdominal CT or US, (no oral contrast if obstructed),  - EKG  - If acute abdomen, notify general surgery.  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 Unlikely a surgical abdomen picture, lack of rebound guarding. No white count to suggest infective process.   CT A/P without acute findings which is reassuring.   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 Unlikely a surgical abdomen picture, lack of rebound guarding. No white count to suggest infective process.   CT A/P without acute findings which is reassuring.   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 Unlikely a surgical abdomen picture, lack of rebound guarding. No white count to suggest infective process. Lipase normal.  CT A/P without acute findings which is reassuring.   - serial abdominal exams  - EKG  - Tylenol 650mg PO q6 hr standing, hydromorphone 2mg q4hr prn for moderate pain, hydromorphone 4mg q4hr prn for severe pain  - bowel regimen while on pain meds

## 2025-06-06 NOTE — ED PROVIDER NOTE - CLINICAL SUMMARY MEDICAL DECISION MAKING FREE TEXT BOX
29y M w/ no significant PMHx, recently dx herpes zoster in R V1 dermatome, recently admitted for viral meningitis, and discharged 2 days ago with picc line in place for acyclovir, presents with an acute onset of L flank pain, severe, sharp, radiating into L side and LLQ. Denies testicular pain hematuria or dysuria, fever or chills nausea vomiting or diarrhea.  Denies prior history of kidney stone.  Denies recent trauma.  Denies headache or midline spinal pain.      Plan to rule out nephrolithiasis, vs pyelo less likely, unlikely dissection or pe. afebrile, no sob or cp.   Will provide analgesics morphine Toradol and Tylenol as well as check all labs and lactate 29y M w/ no significant PMHx, recently dx herpes zoster in R V1 dermatome, recently admitted for viral meningitis, and discharged 2 days ago with picc line in place for acyclovir, presents with an acute onset of L flank pain, severe, sharp, radiating into L side and LLQ. Denies testicular pain hematuria or dysuria, fever or chills nausea vomiting or diarrhea.  Denies prior history of kidney stone.  Denies recent trauma.  Denies headache or midline spinal pain.      Plan to rule out nephrolithiasis, vs pyelo less likely, unlikely dissection or pe. afebrile, no sob or cp.   Will provide analgesics morphine Toradol and Tylenol as well as check all labs and lactate    CR uptrending  at 120pmi consulted Dr. church of ID who recommended admission to hospital for monitoring of Cr and IV acyclovir dose management 29y M w/ no significant PMHx, recently dx herpes zoster in R V1 dermatome, recently admitted for viral meningitis, and discharged 2 days ago with picc line in place for acyclovir, presents with an acute onset of L flank pain, severe, sharp, radiating into L side and LLQ. Denies testicular pain hematuria or dysuria, fever or chills nausea vomiting or diarrhea.  Denies prior history of kidney stone.  Denies recent trauma.  Denies headache or midline spinal pain.      Plan to rule out nephrolithiasis, vs pyelo less likely, unlikely dissection or pe. afebrile, no sob or cp.   Will provide analgesics morphine Toradol and Tylenol as well as check all labs and lactate    CR uptrending  at 120pmi consulted Dr. church of ID who recommended admission to hospital for monitoring of Cr and IV acyclovir dose management  reationale for admission explained to pt  - pt verbally expresses understanding all questions answered

## 2025-06-06 NOTE — ED ADULT NURSE NOTE - NS ED NURSE REPORT GIVEN TO FT
LM  for patient to give a call back to set up a follow up Appt in [place] with Dr Syl Jackson
TERESITA Nunn

## 2025-06-06 NOTE — ED PROVIDER NOTE - CARE PLAN
Principal Discharge DX:	Left flank pain   1 Principal Discharge DX:	CONCEPCIÓN (acute kidney injury)

## 2025-06-07 LAB
ANION GAP SERPL CALC-SCNC: 10 MMOL/L — SIGNIFICANT CHANGE UP (ref 5–17)
ANION GAP SERPL CALC-SCNC: 11 MMOL/L — SIGNIFICANT CHANGE UP (ref 5–17)
ANION GAP SERPL CALC-SCNC: 6 MMOL/L — SIGNIFICANT CHANGE UP (ref 5–17)
APPEARANCE UR: CLEAR — SIGNIFICANT CHANGE UP
BASOPHILS # BLD AUTO: 0.04 K/UL — SIGNIFICANT CHANGE UP (ref 0–0.2)
BASOPHILS NFR BLD AUTO: 0.6 % — SIGNIFICANT CHANGE UP (ref 0–2)
BILIRUB UR-MCNC: NEGATIVE — SIGNIFICANT CHANGE UP
BUN SERPL-MCNC: 19 MG/DL — SIGNIFICANT CHANGE UP (ref 7–23)
BUN SERPL-MCNC: 20 MG/DL — SIGNIFICANT CHANGE UP (ref 7–23)
BUN SERPL-MCNC: 20 MG/DL — SIGNIFICANT CHANGE UP (ref 7–23)
CALCIUM SERPL-MCNC: 8.2 MG/DL — LOW (ref 8.4–10.5)
CALCIUM SERPL-MCNC: 8.3 MG/DL — LOW (ref 8.4–10.5)
CALCIUM SERPL-MCNC: 8.6 MG/DL — SIGNIFICANT CHANGE UP (ref 8.4–10.5)
CHLORIDE SERPL-SCNC: 108 MMOL/L — SIGNIFICANT CHANGE UP (ref 96–108)
CO2 SERPL-SCNC: 21 MMOL/L — LOW (ref 22–31)
CO2 SERPL-SCNC: 24 MMOL/L — SIGNIFICANT CHANGE UP (ref 22–31)
CO2 SERPL-SCNC: 27 MMOL/L — SIGNIFICANT CHANGE UP (ref 22–31)
COLOR SPEC: YELLOW — SIGNIFICANT CHANGE UP
CREAT ?TM UR-MCNC: 46 MG/DL — SIGNIFICANT CHANGE UP
CREAT SERPL-MCNC: 2.15 MG/DL — HIGH (ref 0.5–1.3)
CREAT SERPL-MCNC: 2.24 MG/DL — HIGH (ref 0.5–1.3)
CREAT SERPL-MCNC: 2.74 MG/DL — HIGH (ref 0.5–1.3)
DIFF PNL FLD: NEGATIVE — SIGNIFICANT CHANGE UP
EGFR: 31 ML/MIN/1.73M2 — LOW
EGFR: 31 ML/MIN/1.73M2 — LOW
EGFR: 39 ML/MIN/1.73M2 — LOW
EGFR: 39 ML/MIN/1.73M2 — LOW
EGFR: 41 ML/MIN/1.73M2 — LOW
EGFR: 41 ML/MIN/1.73M2 — LOW
EOSINOPHIL # BLD AUTO: 0.37 K/UL — SIGNIFICANT CHANGE UP (ref 0–0.5)
EOSINOPHIL NFR BLD AUTO: 5.2 % — SIGNIFICANT CHANGE UP (ref 0–6)
GLUCOSE SERPL-MCNC: 116 MG/DL — HIGH (ref 70–99)
GLUCOSE SERPL-MCNC: 88 MG/DL — SIGNIFICANT CHANGE UP (ref 70–99)
GLUCOSE SERPL-MCNC: 93 MG/DL — SIGNIFICANT CHANGE UP (ref 70–99)
GLUCOSE UR QL: NEGATIVE MG/DL — SIGNIFICANT CHANGE UP
HCT VFR BLD CALC: 34.4 % — LOW (ref 39–50)
HGB BLD-MCNC: 11.4 G/DL — LOW (ref 13–17)
IMM GRANULOCYTES NFR BLD AUTO: 0.1 % — SIGNIFICANT CHANGE UP (ref 0–0.9)
KETONES UR QL: NEGATIVE MG/DL — SIGNIFICANT CHANGE UP
LEUKOCYTE ESTERASE UR-ACNC: NEGATIVE — SIGNIFICANT CHANGE UP
LYMPHOCYTES # BLD AUTO: 2.08 K/UL — SIGNIFICANT CHANGE UP (ref 1–3.3)
LYMPHOCYTES # BLD AUTO: 29.3 % — SIGNIFICANT CHANGE UP (ref 13–44)
MAGNESIUM SERPL-MCNC: 1.9 MG/DL — SIGNIFICANT CHANGE UP (ref 1.6–2.6)
MAGNESIUM SERPL-MCNC: 2 MG/DL — SIGNIFICANT CHANGE UP (ref 1.6–2.6)
MCHC RBC-ENTMCNC: 31.1 PG — SIGNIFICANT CHANGE UP (ref 27–34)
MCHC RBC-ENTMCNC: 33.1 G/DL — SIGNIFICANT CHANGE UP (ref 32–36)
MCV RBC AUTO: 94 FL — SIGNIFICANT CHANGE UP (ref 80–100)
MONOCYTES # BLD AUTO: 0.91 K/UL — HIGH (ref 0–0.9)
MONOCYTES NFR BLD AUTO: 12.8 % — SIGNIFICANT CHANGE UP (ref 2–14)
NEUTROPHILS # BLD AUTO: 3.7 K/UL — SIGNIFICANT CHANGE UP (ref 1.8–7.4)
NEUTROPHILS NFR BLD AUTO: 52 % — SIGNIFICANT CHANGE UP (ref 43–77)
NITRITE UR-MCNC: NEGATIVE — SIGNIFICANT CHANGE UP
NRBC BLD AUTO-RTO: 0 /100 WBCS — SIGNIFICANT CHANGE UP (ref 0–0)
OSMOLALITY UR: 219 MOSM/KG — LOW (ref 300–900)
PH UR: 6 — SIGNIFICANT CHANGE UP (ref 5–8)
PHOSPHATE SERPL-MCNC: 4.3 MG/DL — SIGNIFICANT CHANGE UP (ref 2.5–4.5)
PHOSPHATE SERPL-MCNC: 4.6 MG/DL — HIGH (ref 2.5–4.5)
PLATELET # BLD AUTO: 154 K/UL — SIGNIFICANT CHANGE UP (ref 150–400)
POTASSIUM SERPL-MCNC: 4.2 MMOL/L — SIGNIFICANT CHANGE UP (ref 3.5–5.3)
POTASSIUM SERPL-MCNC: 4.2 MMOL/L — SIGNIFICANT CHANGE UP (ref 3.5–5.3)
POTASSIUM SERPL-MCNC: 4.4 MMOL/L — SIGNIFICANT CHANGE UP (ref 3.5–5.3)
POTASSIUM SERPL-SCNC: 4.2 MMOL/L — SIGNIFICANT CHANGE UP (ref 3.5–5.3)
POTASSIUM SERPL-SCNC: 4.2 MMOL/L — SIGNIFICANT CHANGE UP (ref 3.5–5.3)
POTASSIUM SERPL-SCNC: 4.4 MMOL/L — SIGNIFICANT CHANGE UP (ref 3.5–5.3)
POTASSIUM UR-SCNC: 12 MMOL/L — SIGNIFICANT CHANGE UP
PROT ?TM UR-MCNC: 13 MG/DL — HIGH (ref 0–12)
PROT UR-MCNC: NEGATIVE MG/DL — SIGNIFICANT CHANGE UP
PROT/CREAT UR-RTO: 0.3 RATIO — HIGH (ref 0–0.2)
RBC # BLD: 3.66 M/UL — LOW (ref 4.2–5.8)
RBC # FLD: 12.7 % — SIGNIFICANT CHANGE UP (ref 10.3–14.5)
SODIUM SERPL-SCNC: 140 MMOL/L — SIGNIFICANT CHANGE UP (ref 135–145)
SODIUM SERPL-SCNC: 141 MMOL/L — SIGNIFICANT CHANGE UP (ref 135–145)
SODIUM SERPL-SCNC: 142 MMOL/L — SIGNIFICANT CHANGE UP (ref 135–145)
SODIUM UR-SCNC: 61 MMOL/L — SIGNIFICANT CHANGE UP
SP GR SPEC: 1.01 — SIGNIFICANT CHANGE UP (ref 1–1.03)
UROBILINOGEN FLD QL: 0.2 MG/DL — SIGNIFICANT CHANGE UP (ref 0.2–1)
UUN UR-MCNC: 178 MG/DL — SIGNIFICANT CHANGE UP
WBC # BLD: 7.11 K/UL — SIGNIFICANT CHANGE UP (ref 3.8–10.5)
WBC # FLD AUTO: 7.11 K/UL — SIGNIFICANT CHANGE UP (ref 3.8–10.5)

## 2025-06-07 PROCEDURE — 99232 SBSQ HOSP IP/OBS MODERATE 35: CPT | Mod: GC

## 2025-06-07 PROCEDURE — 99223 1ST HOSP IP/OBS HIGH 75: CPT

## 2025-06-07 PROCEDURE — 76775 US EXAM ABDO BACK WALL LIM: CPT | Mod: 26

## 2025-06-07 RX ORDER — SENNA 187 MG
2 TABLET ORAL AT BEDTIME
Refills: 0 | Status: DISCONTINUED | OUTPATIENT
Start: 2025-06-07 | End: 2025-06-09

## 2025-06-07 RX ORDER — POLYETHYLENE GLYCOL 3350 17 G/17G
17 POWDER, FOR SOLUTION ORAL DAILY
Refills: 0 | Status: DISCONTINUED | OUTPATIENT
Start: 2025-06-07 | End: 2025-06-09

## 2025-06-07 RX ORDER — GANCICLOVIR 250 MG
200 CAPSULE ORAL DAILY
Refills: 0 | Status: DISCONTINUED | OUTPATIENT
Start: 2025-06-07 | End: 2025-06-08

## 2025-06-07 RX ADMIN — Medication 650 MILLIGRAM(S): at 11:44

## 2025-06-07 RX ADMIN — Medication 4 MILLIGRAM(S): at 00:03

## 2025-06-07 RX ADMIN — DIAZEPAM 2 MILLIGRAM(S): 5 TABLET ORAL at 05:22

## 2025-06-07 RX ADMIN — Medication 4 MILLIGRAM(S): at 00:00

## 2025-06-07 RX ADMIN — Medication 4 MILLIGRAM(S): at 11:36

## 2025-06-07 RX ADMIN — Medication 650 MILLIGRAM(S): at 18:07

## 2025-06-07 RX ADMIN — Medication 100 MILLIGRAM(S): at 13:32

## 2025-06-07 RX ADMIN — Medication 650 MILLIGRAM(S): at 11:36

## 2025-06-07 RX ADMIN — Medication 4 MILLIGRAM(S): at 19:55

## 2025-06-07 RX ADMIN — Medication 150 MILLILITER(S): at 22:40

## 2025-06-07 RX ADMIN — Medication 4 MILLIGRAM(S): at 07:55

## 2025-06-07 RX ADMIN — Medication 4 MILLIGRAM(S): at 08:32

## 2025-06-07 RX ADMIN — DIAZEPAM 2 MILLIGRAM(S): 5 TABLET ORAL at 18:07

## 2025-06-07 RX ADMIN — Medication 650 MILLIGRAM(S): at 18:15

## 2025-06-07 RX ADMIN — Medication 650 MILLIGRAM(S): at 05:22

## 2025-06-07 RX ADMIN — Medication 4 MILLIGRAM(S): at 06:55

## 2025-06-07 NOTE — PROGRESS NOTE ADULT - PROBLEM SELECTOR PLAN 1
On admission Cr: 1.92 ->2.13 (baseline 0.9-1.1).  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  - Obtain bladder scan to rule out obstruction  - renal U/S  - hold IV acyclovir 800mg q8hr x 14 days (last day June 11th) overnight, pending discussion with ID and nephro  - Continue to trend Cr, 10pm bmp  - Avoid nephrotoxic agents as able  - Adjust medication dosages for level of renal function  - 125cc/hr NS, strict ins and outs  - rest of plan as above On admission Cr: 1.92 ->2.13 (baseline 0.9-1.1) -> 2.74 this morning. 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  - Obtain bladder scan to rule out obstruction  - renal U/S  - hold IV acyclovir 800mg q8hr x 14 days (last day June 11th) overnight, pending discussion with ID and nephro  - Continue to trend Cr, 10pm bmp  - Avoid nephrotoxic agents as able  - Adjust medication dosages for level of renal function  - 125cc/hr NS, strict ins and outs  - rest of plan as above

## 2025-06-07 NOTE — PROGRESS NOTE ADULT - ASSESSMENT
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

## 2025-06-07 NOTE — CONSULT NOTE ADULT - ASSESSMENT
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 10 days is minimum for treatment of VZV encephalitis, but I am hesitant to stop his therapy at this time as he still has some headache (though now very mild), and particularly given recent ED visit for conjunctivitis and pain near R eye, I think he needs a repeat ophthalmologic evaluation prior to therapy discontinuation. I spoke with ophtho on call to discuss case, and they will evaluate in person tomorrow.  - For now, hold acyclovir, and start ganciclovir 200mg IV q24h (2.5mg/kg - renal dosed from 5mg/kg q12h dose). Ganciclovir is an alternative agent for tx of VZV encephalitis and until kidneys show improvement I believe this agent has a better risk/benefit profile compared to renal dosed acyclovir. If kidneys improve, we may be able to re-trial renal dosed acyclovir as slow infusion over 2 hours, with aggressive IVF (discussed with nephrology fellow who is in agreement). Duration of therapy is TBD pending re-evaluation of this current mild headache, and ophthalmology evaluation.    ID Team 1 will follow

## 2025-06-07 NOTE — CONSULT NOTE ADULT - ASSESSMENT
29M with PMH of recent admission for VZV meningitis with possible opthalmologic involvement. Was dc on IV acyclovir via picc, now returning with flank pain CONCEPCIÓN. Suspected Acyclovir Nephrotoxicity(intratubular precipitation).     Last 24h uop: 750ml  SBP 120s    -Imp: Non oliguric ATN.   Highly suspected Acyclovir Nephrotoxicity causing intratubular precipitation.  [place] UA.     -Plan:  Risk/benefit favors continuing antiviral therapy with renally dosed Ganciclovir, especially if ophthalmic involvement is present. ID following.   Patient needs strict I&O, place Trejo and monitor uop. Goal is minimum uop>100ml/hr.   Increase NS to 150ml/hr, continuous. Titrate IVF as need it in order to maintain uop>100ml/hr.   Close monitoring respiratory status.   If signs of volume overload or uop<0.5 ml/kg/hr, stop IVF and notify Nephrology asap, pte will need iHD most likely.   29M with PMH of recent admission for VZV meningitis with possible opthalmologic involvement. Was dc on IV acyclovir via picc, now returning with flank pain CONCEPCIÓN. Suspected Acyclovir Nephrotoxicity(intratubular precipitation).     Last 24h uop: 750ml  SBP 120s    -Imp: Non oliguric ATN.   Highly suspected Acyclovir Nephrotoxicity causing intratubular precipitation.  [place] UA.     -Plan:  Risk/benefit favors continuing antiviral therapy with renally dosed Ganciclovir, especially if ophthalmic involvement is present. ID following.   Patient needs strict I&O, place Trejo and monitor uop. Goal is minimum uop>100ml/hr.   Increase NS to 150ml/hr, continuous. Titrate IVF as need it in order to maintain uop>100ml/hr.   Close monitoring respiratory status.   If signs of volume overload or uop<0.5 ml/kg/hr, stop IVF and notify Nephrology asap, pte will need iHD most likely.    Obtain Cystatin C  Avoid hypotension. SBP>110, for renal perfusion.

## 2025-06-07 NOTE — CONSULT NOTE ADULT - ATTENDING COMMENTS
Consulted for non oliguric CONCEPCIÓN likely related to Acyclovir Nephrotoxicity in the setting of VZV meningitis    Agree w/ trial of IVF (NaCl 0.9% at 150ml/hr)   monitor respiratory status while on IVF  Request work up as above    further recs as above  Discussed with primary team

## 2025-06-07 NOTE — PROGRESS NOTE ADULT - PROBLEM SELECTOR PLAN 2
Previous Results:  -> LP 5/28: 1 RBC, 0 PMNs, Gram stain negative no organisms  -> CSF PCR from 5/28 LP: Negative  -> CTH non-con 5/28: Within Normal Limits  -> BCx 5/28: NG5D x 2 sets  -> VZV swab 5/29: negative  -> MRI brain with IV contrast 5/30: WNL  Denies headache, alert oriented.   - of note did clinically improve with starting empiric varicella treatment  - hold IV acyclovir 800mg q8hr x 14 days (last day June 11th) overnight, pending discussion with ID and nephro  - given rise in creatinine will need to continue aggressive hydration, s/p 3L NS in ED, start 125cc/hr NS  - goal urine output above 75 mL/hour  - complete recovery typically occurs within four to nine days after acyclovir is discontinued, will aggressively hydrate for now. Will need an interdisciplinary discussion on benefit of continued acyclovir pending BMP trend  - airborne and contact precautions  - ID and nephro consult in am

## 2025-06-07 NOTE — PROGRESS NOTE ADULT - SUBJECTIVE AND OBJECTIVE BOX
CC: Patient is a 29y old  Male who presents with a chief complaint of headache     INTERVAL EVENTS: ELOY  SUBJECTIVE / INTERVAL HPI: Patient seen and examined at bedside.   ROS: negative unless otherwise stated above.    VITAL SIGNS:  Vital Signs Last 24 Hrs  T(C): 36.6 (07 Jun 2025 05:27), Max: 36.7 (06 Jun 2025 17:17)  T(F): 97.8 (07 Jun 2025 05:27), Max: 98.1 (06 Jun 2025 21:50)  HR: 69 (07 Jun 2025 05:27) (53 - 69)  BP: 130/83 (07 Jun 2025 05:27) (130/83 - 153/89)  BP(mean): --  RR: 17 (07 Jun 2025 05:27) (15 - 17)  SpO2: 97% (07 Jun 2025 05:27) (97% - 100%)    Parameters below as of 07 Jun 2025 05:27  Patient On (Oxygen Delivery Method): room air          06-06-25 @ 07:01  -  06-07-25 @ 06:56  --------------------------------------------------------  IN: 0 mL / OUT: 750 mL / NET: -750 mL        PHYSICAL EXAM:  Constitutional: mild distress,   HEENT: NC/AT, PERRL/EOMI, anicteric sclera, No JVD, mildly dry MM  Respiratory: CTA B/L; no audible wheezing/rhonchi/rales  Cardiac: RRR; no M/R/G  Gastrointestinal: soft, ND; no rebound or guarding; +BS, pain on manipulation to the b/l flanks  Back: spine midline, no bony tenderness or step-offs; no CVAT B/L  Extremities: WWP, no clubbing or cyanosis; no peripheral edema  Vascular: 2+ radial & DP pulses  Dermatologic: skin warm, dry and intact; no rashes, wounds, or scars  Neurologic: AAOx3; CNII-XII grossly intact; no focal deficits  Psychiatric: affect and characteristics of appearance, verbalizations, behaviors are appropriate    MEDICATIONS:  MEDICATIONS  (STANDING):  acetaminophen     Tablet .. 650 milliGRAM(s) Oral every 6 hours  diazepam    Tablet 2 milliGRAM(s) Oral every 12 hours  polyethylene glycol 3350 17 Gram(s) Oral daily  senna 2 Tablet(s) Oral at bedtime  sodium chloride 0.9%. 1000 milliLiter(s) (125 mL/Hr) IV Continuous <Continuous>    MEDICATIONS  (PRN):  aluminum hydroxide/magnesium hydroxide/simethicone Suspension 30 milliLiter(s) Oral every 4 hours PRN Dyspepsia  HYDROmorphone   Tablet 2 milliGRAM(s) Oral every 4 hours PRN Moderate Pain (4 - 6)  HYDROmorphone   Tablet 4 milliGRAM(s) Oral every 4 hours PRN Severe Pain (7 - 10)  melatonin 3 milliGRAM(s) Oral at bedtime PRN Insomnia  ondansetron Injectable 4 milliGRAM(s) IV Push every 8 hours PRN Nausea and/or Vomiting      ALLERGIES:  Allergies    No Known Allergies    Intolerances        LABS:                        12.5   9.79  )-----------( 195      ( 06 Jun 2025 06:03 )             37.4     06-06    143  |  106  |  20  ----------------------------<  88  4.2   |  25  |  2.48[H]    Ca    8.4      06 Jun 2025 22:00  Mg     1.9     06-06    TPro  7.3  /  Alb  4.2  /  TBili  0.4  /  DBili  x   /  AST  20  /  ALT  25  /  AlkPhos  71  06-06      Urinalysis Basic - ( 06 Jun 2025 22:00 )    Color: x / Appearance: x / SG: x / pH: x  Gluc: 88 mg/dL / Ketone: x  / Bili: x / Urobili: x   Blood: x / Protein: x / Nitrite: x   Leuk Esterase: x / RBC: x / WBC x   Sq Epi: x / Non Sq Epi: x / Bacteria: x      CAPILLARY BLOOD GLUCOSE          RADIOLOGY & ADDITIONAL TESTS: Reviewed. CC: Patient is a 29y old  Male who presents with a chief complaint of headache     INTERVAL EVENTS: ELOY  SUBJECTIVE / INTERVAL HPI: Patient seen and examined at bedside. Is comfortable. Had pain in his b/l CVA area yesterday which improved this morning.   ROS: negative unless otherwise stated above.    VITAL SIGNS:  Vital Signs Last 24 Hrs  T(C): 36.6 (07 Jun 2025 05:27), Max: 36.7 (06 Jun 2025 17:17)  T(F): 97.8 (07 Jun 2025 05:27), Max: 98.1 (06 Jun 2025 21:50)  HR: 69 (07 Jun 2025 05:27) (53 - 69)  BP: 130/83 (07 Jun 2025 05:27) (130/83 - 153/89)  BP(mean): --  RR: 17 (07 Jun 2025 05:27) (15 - 17)  SpO2: 97% (07 Jun 2025 05:27) (97% - 100%)    Parameters below as of 07 Jun 2025 05:27  Patient On (Oxygen Delivery Method): room air          06-06-25 @ 07:01  -  06-07-25 @ 06:56  --------------------------------------------------------  IN: 0 mL / OUT: 750 mL / NET: -750 mL        PHYSICAL EXAM:  Constitutional: comfortable, laying down in bed    HEENT: NC/AT, PERRL/EOMI, anicteric sclera, No JVD, mildly dry MM  Respiratory: CTA B/L; no audible wheezing/rhonchi/rales  Cardiac: RRR; no M/R/G  Gastrointestinal: soft, ND; no rebound or guarding; +BS, pain on manipulation to the b/l flanks  Back: spine midline, no bony tenderness or step-offs; no CVAT B/L  Extremities: WWP, no clubbing or cyanosis; no peripheral edema  Vascular: 2+ radial & DP pulses  Dermatologic: skin warm, dry and intact; no rashes, wounds, or scars  Neurologic: AAOx3; CNII-XII grossly intact; no focal deficits  Psychiatric: affect and characteristics of appearance, verbalizations, behaviors are appropriate    MEDICATIONS:  MEDICATIONS  (STANDING):  acetaminophen     Tablet .. 650 milliGRAM(s) Oral every 6 hours  diazepam    Tablet 2 milliGRAM(s) Oral every 12 hours  polyethylene glycol 3350 17 Gram(s) Oral daily  senna 2 Tablet(s) Oral at bedtime  sodium chloride 0.9%. 1000 milliLiter(s) (125 mL/Hr) IV Continuous <Continuous>    MEDICATIONS  (PRN):  aluminum hydroxide/magnesium hydroxide/simethicone Suspension 30 milliLiter(s) Oral every 4 hours PRN Dyspepsia  HYDROmorphone   Tablet 2 milliGRAM(s) Oral every 4 hours PRN Moderate Pain (4 - 6)  HYDROmorphone   Tablet 4 milliGRAM(s) Oral every 4 hours PRN Severe Pain (7 - 10)  melatonin 3 milliGRAM(s) Oral at bedtime PRN Insomnia  ondansetron Injectable 4 milliGRAM(s) IV Push every 8 hours PRN Nausea and/or Vomiting      ALLERGIES:  Allergies    No Known Allergies    Intolerances        LABS:                        12.5   9.79  )-----------( 195      ( 06 Jun 2025 06:03 )             37.4     06-06    143  |  106  |  20  ----------------------------<  88  4.2   |  25  |  2.48[H]    Ca    8.4      06 Jun 2025 22:00  Mg     1.9     06-06    TPro  7.3  /  Alb  4.2  /  TBili  0.4  /  DBili  x   /  AST  20  /  ALT  25  /  AlkPhos  71  06-06      Urinalysis Basic - ( 06 Jun 2025 22:00 )    Color: x / Appearance: x / SG: x / pH: x  Gluc: 88 mg/dL / Ketone: x  / Bili: x / Urobili: x   Blood: x / Protein: x / Nitrite: x   Leuk Esterase: x / RBC: x / WBC x   Sq Epi: x / Non Sq Epi: x / Bacteria: x      CAPILLARY BLOOD GLUCOSE          RADIOLOGY & ADDITIONAL TESTS: Reviewed.

## 2025-06-08 LAB
ALBUMIN SERPL ELPH-MCNC: 3.5 G/DL — SIGNIFICANT CHANGE UP (ref 3.3–5)
ALP SERPL-CCNC: 56 U/L — SIGNIFICANT CHANGE UP (ref 40–120)
ALT FLD-CCNC: 12 U/L — SIGNIFICANT CHANGE UP (ref 10–45)
ANION GAP SERPL CALC-SCNC: 8 MMOL/L — SIGNIFICANT CHANGE UP (ref 5–17)
AST SERPL-CCNC: 17 U/L — SIGNIFICANT CHANGE UP (ref 10–40)
BILIRUB SERPL-MCNC: 0.2 MG/DL — SIGNIFICANT CHANGE UP (ref 0.2–1.2)
BLD GP AB SCN SERPL QL: NEGATIVE — SIGNIFICANT CHANGE UP
BUN SERPL-MCNC: 19 MG/DL — SIGNIFICANT CHANGE UP (ref 7–23)
CALCIUM SERPL-MCNC: 8.4 MG/DL — SIGNIFICANT CHANGE UP (ref 8.4–10.5)
CHLORIDE SERPL-SCNC: 111 MMOL/L — HIGH (ref 96–108)
CO2 SERPL-SCNC: 22 MMOL/L — SIGNIFICANT CHANGE UP (ref 22–31)
CREAT SERPL-MCNC: 1.92 MG/DL — HIGH (ref 0.5–1.3)
EGFR: 47 ML/MIN/1.73M2 — LOW
EGFR: 47 ML/MIN/1.73M2 — LOW
GLUCOSE SERPL-MCNC: 97 MG/DL — SIGNIFICANT CHANGE UP (ref 70–99)
HCT VFR BLD CALC: 32 % — LOW (ref 39–50)
HGB BLD-MCNC: 10.5 G/DL — LOW (ref 13–17)
MAGNESIUM SERPL-MCNC: 2 MG/DL — SIGNIFICANT CHANGE UP (ref 1.6–2.6)
MCHC RBC-ENTMCNC: 30.9 PG — SIGNIFICANT CHANGE UP (ref 27–34)
MCHC RBC-ENTMCNC: 32.8 G/DL — SIGNIFICANT CHANGE UP (ref 32–36)
MCV RBC AUTO: 94.1 FL — SIGNIFICANT CHANGE UP (ref 80–100)
NRBC BLD AUTO-RTO: 0 /100 WBCS — SIGNIFICANT CHANGE UP (ref 0–0)
PHOSPHATE SERPL-MCNC: 4.7 MG/DL — HIGH (ref 2.5–4.5)
PLATELET # BLD AUTO: 126 K/UL — LOW (ref 150–400)
POTASSIUM SERPL-MCNC: 4.4 MMOL/L — SIGNIFICANT CHANGE UP (ref 3.5–5.3)
POTASSIUM SERPL-SCNC: 4.4 MMOL/L — SIGNIFICANT CHANGE UP (ref 3.5–5.3)
PROT SERPL-MCNC: 5.3 G/DL — LOW (ref 6–8.3)
RBC # BLD: 3.4 M/UL — LOW (ref 4.2–5.8)
RBC # FLD: 12.5 % — SIGNIFICANT CHANGE UP (ref 10.3–14.5)
RH IG SCN BLD-IMP: POSITIVE — SIGNIFICANT CHANGE UP
SODIUM SERPL-SCNC: 141 MMOL/L — SIGNIFICANT CHANGE UP (ref 135–145)
WBC # BLD: 5.19 K/UL — SIGNIFICANT CHANGE UP (ref 3.8–10.5)
WBC # FLD AUTO: 5.19 K/UL — SIGNIFICANT CHANGE UP (ref 3.8–10.5)

## 2025-06-08 PROCEDURE — 99233 SBSQ HOSP IP/OBS HIGH 50: CPT

## 2025-06-08 PROCEDURE — 99232 SBSQ HOSP IP/OBS MODERATE 35: CPT | Mod: GC

## 2025-06-08 RX ADMIN — DIAZEPAM 2 MILLIGRAM(S): 5 TABLET ORAL at 06:16

## 2025-06-08 RX ADMIN — Medication 650 MILLIGRAM(S): at 00:06

## 2025-06-08 RX ADMIN — Medication 4 MILLIGRAM(S): at 14:52

## 2025-06-08 RX ADMIN — Medication 2 TABLET(S): at 06:16

## 2025-06-08 RX ADMIN — Medication 4 MILLIGRAM(S): at 14:10

## 2025-06-08 RX ADMIN — Medication 2 MILLIGRAM(S): at 23:56

## 2025-06-08 RX ADMIN — Medication 133 MILLIGRAM(S): at 11:38

## 2025-06-08 RX ADMIN — Medication 650 MILLIGRAM(S): at 17:35

## 2025-06-08 RX ADMIN — Medication 4 MILLIGRAM(S): at 00:06

## 2025-06-08 RX ADMIN — POLYETHYLENE GLYCOL 3350 17 GRAM(S): 17 POWDER, FOR SOLUTION ORAL at 11:24

## 2025-06-08 RX ADMIN — DIAZEPAM 2 MILLIGRAM(S): 5 TABLET ORAL at 17:35

## 2025-06-08 RX ADMIN — Medication 650 MILLIGRAM(S): at 23:43

## 2025-06-08 RX ADMIN — Medication 650 MILLIGRAM(S): at 11:24

## 2025-06-08 RX ADMIN — Medication 133 MILLIGRAM(S): at 23:43

## 2025-06-08 RX ADMIN — Medication 4 MILLIGRAM(S): at 19:51

## 2025-06-08 RX ADMIN — Medication 150 MILLILITER(S): at 05:26

## 2025-06-08 RX ADMIN — Medication 650 MILLIGRAM(S): at 17:39

## 2025-06-08 RX ADMIN — Medication 4 MILLIGRAM(S): at 01:06

## 2025-06-08 RX ADMIN — Medication 2 MILLIGRAM(S): at 01:00

## 2025-06-08 RX ADMIN — Medication 650 MILLIGRAM(S): at 12:09

## 2025-06-08 RX ADMIN — Medication 650 MILLIGRAM(S): at 06:16

## 2025-06-08 NOTE — PROGRESS NOTE ADULT - SUBJECTIVE AND OBJECTIVE BOX
***Note in progress***    OVERNIGHT EVENTS: NAEO    SUBJECTIVE / INTERVAL HPI: Patient seen and examined at bedside. Patient denying chest pain, SOB, palpitations, cough. Patient denies fever, chills, HA, Dizziness, N/V, abdominal pain, diarrhea, constipation, hematochezia/melena, dysuria, hematuria, new onset weakness/numbness, LE pain and/or swelling.    Remaining ROS negative       PHYSICAL EXAM:    General:NAD.   HEENT: NC/AT; PERRL, anicteric sclera; MMM  Neck: supple  Cardiovascular: +S1/S2, RRR  Respiratory: CTA B/L; no W/R/R  Gastrointestinal: soft, NT/ND; +BSx4  Extremities: WWP; no edema, clubbing or cyanosis  Vascular: 2+ radial, DP/PT pulses B/L  Neurological: AAOx3; no focal deficits  Psychiatric: pleasant mood and affect  Dermatologic: no appreciable wounds or damage to the skin    VITAL SIGNS:  Vital Signs Last 24 Hrs  T(C): 36.5 (08 Jun 2025 06:01), Max: 36.7 (07 Jun 2025 12:00)  T(F): 97.7 (08 Jun 2025 06:01), Max: 98 (07 Jun 2025 12:00)  HR: 60 (08 Jun 2025 06:01) (60 - 63)  BP: 113/75 (08 Jun 2025 06:01) (113/75 - 135/81)  BP(mean): 88 (08 Jun 2025 06:01) (88 - 88)  RR: 18 (08 Jun 2025 06:01) (18 - 18)  SpO2: 97% (08 Jun 2025 06:01) (97% - 98%)    Parameters below as of 08 Jun 2025 06:01  Patient On (Oxygen Delivery Method): room air        MEDICATIONS:  MEDICATIONS  (STANDING):  acetaminophen     Tablet .. 650 milliGRAM(s) Oral every 6 hours  diazepam    Tablet 2 milliGRAM(s) Oral every 12 hours  ganciclovir IVPB 200 milliGRAM(s) IV Intermittent daily  polyethylene glycol 3350 17 Gram(s) Oral daily  senna 2 Tablet(s) Oral at bedtime  sodium chloride 0.9%. 1000 milliLiter(s) (150 mL/Hr) IV Continuous <Continuous>    MEDICATIONS  (PRN):  aluminum hydroxide/magnesium hydroxide/simethicone Suspension 30 milliLiter(s) Oral every 4 hours PRN Dyspepsia  HYDROmorphone   Tablet 2 milliGRAM(s) Oral every 4 hours PRN Moderate Pain (4 - 6)  HYDROmorphone   Tablet 4 milliGRAM(s) Oral every 4 hours PRN Severe Pain (7 - 10)  melatonin 3 milliGRAM(s) Oral at bedtime PRN Insomnia  ondansetron Injectable 4 milliGRAM(s) IV Push every 8 hours PRN Nausea and/or Vomiting      ALLERGIES:  Allergies    No Known Allergies    Intolerances        LABS:                        11.4   7.11  )-----------( 154      ( 07 Jun 2025 08:27 )             34.4     06-07    142  |  108  |  20  ----------------------------<  88  4.2   |  24  |  2.15[H]    Ca    8.3[L]      07 Jun 2025 22:00  Phos  4.3     06-07  Mg     2.0     06-07        Urinalysis Basic - ( 07 Jun 2025 22:00 )    Color: x / Appearance: x / SG: x / pH: x  Gluc: 88 mg/dL / Ketone: x  / Bili: x / Urobili: x   Blood: x / Protein: x / Nitrite: x   Leuk Esterase: x / RBC: x / WBC x   Sq Epi: x / Non Sq Epi: x / Bacteria: x      CAPILLARY BLOOD GLUCOSE          RADIOLOGY & ADDITIONAL TESTS: Reviewed. ***Note in progress***    OVERNIGHT EVENTS: NAEO    SUBJECTIVE / INTERVAL HPI: Patient seen and examined at bedside. Endorses "floaters" in b/l eyes, R>L, unchanged from yesterday. Denies any double vision, dizziness, vertigo, headaches, nausea or vomiting. Reports improvement in flank pain, described as intermittent "waves" of pain, with one episode last night but significantly improved from the previous evening.  Patient denying chest pain, SOB, palpitations, cough. Patient denies fever, chills, abdominal pain.     Remaining ROS negative       PHYSICAL EXAM:    General:NAD.   HEENT: NC/AT; PERRL, anicteric sclera; MMM  Neck: supple  Cardiovascular: +S1/S2, RRR  Respiratory: CTA B/L; no W/R/R  Gastrointestinal: soft, NT/ND; +BSx4,   Back: No CVA tenderness, no paraspinal tenderness   Extremities: WWP; no edema, clubbing or cyanosis  Vascular: 2+ radial, DP/PT pulses B/L  Neurological: AAOx3; cranial nerves II-XII intact,   Psychiatric: pleasant mood and affect  Dermatologic: no appreciable wounds or damage to the skin    VITAL SIGNS:  Vital Signs Last 24 Hrs  T(C): 36.5 (08 Jun 2025 06:01), Max: 36.7 (07 Jun 2025 12:00)  T(F): 97.7 (08 Jun 2025 06:01), Max: 98 (07 Jun 2025 12:00)  HR: 60 (08 Jun 2025 06:01) (60 - 63)  BP: 113/75 (08 Jun 2025 06:01) (113/75 - 135/81)  BP(mean): 88 (08 Jun 2025 06:01) (88 - 88)  RR: 18 (08 Jun 2025 06:01) (18 - 18)  SpO2: 97% (08 Jun 2025 06:01) (97% - 98%)    Parameters below as of 08 Jun 2025 06:01  Patient On (Oxygen Delivery Method): room air        MEDICATIONS:  MEDICATIONS  (STANDING):  acetaminophen     Tablet .. 650 milliGRAM(s) Oral every 6 hours  diazepam    Tablet 2 milliGRAM(s) Oral every 12 hours  ganciclovir IVPB 200 milliGRAM(s) IV Intermittent daily  polyethylene glycol 3350 17 Gram(s) Oral daily  senna 2 Tablet(s) Oral at bedtime  sodium chloride 0.9%. 1000 milliLiter(s) (150 mL/Hr) IV Continuous <Continuous>    MEDICATIONS  (PRN):  aluminum hydroxide/magnesium hydroxide/simethicone Suspension 30 milliLiter(s) Oral every 4 hours PRN Dyspepsia  HYDROmorphone   Tablet 2 milliGRAM(s) Oral every 4 hours PRN Moderate Pain (4 - 6)  HYDROmorphone   Tablet 4 milliGRAM(s) Oral every 4 hours PRN Severe Pain (7 - 10)  melatonin 3 milliGRAM(s) Oral at bedtime PRN Insomnia  ondansetron Injectable 4 milliGRAM(s) IV Push every 8 hours PRN Nausea and/or Vomiting      ALLERGIES:  Allergies    No Known Allergies    Intolerances        LABS:                        11.4   7.11  )-----------( 154      ( 07 Jun 2025 08:27 )             34.4     06-07    142  |  108  |  20  ----------------------------<  88  4.2   |  24  |  2.15[H]    Ca    8.3[L]      07 Jun 2025 22:00  Phos  4.3     06-07  Mg     2.0     06-07        Urinalysis Basic - ( 07 Jun 2025 22:00 )    Color: x / Appearance: x / SG: x / pH: x  Gluc: 88 mg/dL / Ketone: x  / Bili: x / Urobili: x   Blood: x / Protein: x / Nitrite: x   Leuk Esterase: x / RBC: x / WBC x   Sq Epi: x / Non Sq Epi: x / Bacteria: x      CAPILLARY BLOOD GLUCOSE          RADIOLOGY & ADDITIONAL TESTS: Reviewed. OVERNIGHT EVENTS: NAEO    SUBJECTIVE / INTERVAL HPI: Patient seen and examined at bedside. Endorses "floaters" in b/l eyes, R>L, unchanged from yesterday. Denies any double vision, dizziness, vertigo, headaches, nausea or vomiting. Reports improvement in flank pain, described as intermittent "waves" of pain, with one episode last night but significantly improved from the previous evening.  Patient denying chest pain, SOB, palpitations, cough. Patient denies fever, chills, abdominal pain.     Remaining ROS negative       PHYSICAL EXAM:    General:NAD.   HEENT: NC/AT; PERRL, anicteric sclera; MMM  Neck: supple  Cardiovascular: +S1/S2, RRR  Respiratory: CTA B/L; no W/R/R  Gastrointestinal: soft, NT/ND; +BSx4,   Back: No CVA tenderness, no paraspinal tenderness   Extremities: WWP; no edema, clubbing or cyanosis  Vascular: 2+ radial, DP/PT pulses B/L  Neurological: AAOx3; cranial nerves II-XII intact,   Psychiatric: pleasant mood and affect  Dermatologic: no appreciable wounds or damage to the skin    VITAL SIGNS:  Vital Signs Last 24 Hrs  T(C): 36.5 (08 Jun 2025 06:01), Max: 36.7 (07 Jun 2025 12:00)  T(F): 97.7 (08 Jun 2025 06:01), Max: 98 (07 Jun 2025 12:00)  HR: 60 (08 Jun 2025 06:01) (60 - 63)  BP: 113/75 (08 Jun 2025 06:01) (113/75 - 135/81)  BP(mean): 88 (08 Jun 2025 06:01) (88 - 88)  RR: 18 (08 Jun 2025 06:01) (18 - 18)  SpO2: 97% (08 Jun 2025 06:01) (97% - 98%)    Parameters below as of 08 Jun 2025 06:01  Patient On (Oxygen Delivery Method): room air        MEDICATIONS:  MEDICATIONS  (STANDING):  acetaminophen     Tablet .. 650 milliGRAM(s) Oral every 6 hours  diazepam    Tablet 2 milliGRAM(s) Oral every 12 hours  ganciclovir IVPB 200 milliGRAM(s) IV Intermittent daily  polyethylene glycol 3350 17 Gram(s) Oral daily  senna 2 Tablet(s) Oral at bedtime  sodium chloride 0.9%. 1000 milliLiter(s) (150 mL/Hr) IV Continuous <Continuous>    MEDICATIONS  (PRN):  aluminum hydroxide/magnesium hydroxide/simethicone Suspension 30 milliLiter(s) Oral every 4 hours PRN Dyspepsia  HYDROmorphone   Tablet 2 milliGRAM(s) Oral every 4 hours PRN Moderate Pain (4 - 6)  HYDROmorphone   Tablet 4 milliGRAM(s) Oral every 4 hours PRN Severe Pain (7 - 10)  melatonin 3 milliGRAM(s) Oral at bedtime PRN Insomnia  ondansetron Injectable 4 milliGRAM(s) IV Push every 8 hours PRN Nausea and/or Vomiting      ALLERGIES:  Allergies    No Known Allergies    Intolerances        LABS:                        11.4   7.11  )-----------( 154      ( 07 Jun 2025 08:27 )             34.4     06-07    142  |  108  |  20  ----------------------------<  88  4.2   |  24  |  2.15[H]    Ca    8.3[L]      07 Jun 2025 22:00  Phos  4.3     06-07  Mg     2.0     06-07        Urinalysis Basic - ( 07 Jun 2025 22:00 )    Color: x / Appearance: x / SG: x / pH: x  Gluc: 88 mg/dL / Ketone: x  / Bili: x / Urobili: x   Blood: x / Protein: x / Nitrite: x   Leuk Esterase: x / RBC: x / WBC x   Sq Epi: x / Non Sq Epi: x / Bacteria: x      CAPILLARY BLOOD GLUCOSE          RADIOLOGY & ADDITIONAL TESTS: Reviewed.

## 2025-06-08 NOTE — CHART NOTE - NSCHARTNOTEFT_GEN_A_CORE
sCr improving, SBP at goal, good uop, no signs of volume overload.   Continue IVF, can switch to LR @ 150ml/hr, continuously.

## 2025-06-08 NOTE — PROGRESS NOTE ADULT - PROBLEM SELECTOR PLAN 5
F: 125cc/hr NS  E: Replete as needed  N: Regular diet  Dvt ppx: low improve, scds  GI ppx: ni  Activity: full  Code status: full F: 150cc/hr NS  E: Replete as needed  N: Regular diet  Dvt ppx: low improve, scds  GI ppx: ni  Activity: full  Code status: full Pt states "  I was having lower back pain for a week ... I was diagnosed on Monday with diverticulosis but this back pain is getting worse.. " States abd pain improving slightly

## 2025-06-08 NOTE — PROGRESS NOTE ADULT - PROBLEM SELECTOR PLAN 2
Previous Results:  -> LP 5/28: 1 RBC, 0 PMNs, Gram stain negative no organisms  -> CSF PCR from 5/28 LP: Negative  -> CTH non-con 5/28: Within Normal Limits  -> BCx 5/28: NG5D x 2 sets  -> VZV swab 5/29: negative  -> MRI brain with IV contrast 5/30: WNL  Denies headache, alert oriented.   - of note did clinically improve with starting empiric varicella treatment  - hold IV acyclovir 800mg q8hr x 14 days (last day June 11th) overnight, pending discussion with ID and nephro  - given rise in creatinine will need to continue aggressive hydration, s/p 3L NS in ED, start 125cc/hr NS  - goal urine output above 75 mL/hour  - complete recovery typically occurs within four to nine days after acyclovir is discontinued, will aggressively hydrate for now. Will need an interdisciplinary discussion on benefit of continued acyclovir pending BMP trend  - airborne and contact precautions  - ID and nephro consult in am Previous Results:  -> LP 5/28: 1 RBC, 0 PMNs, Gram stain negative no organisms  -> CSF PCR from 5/28 LP: Negative  -> CTH non-con 5/28: Within Normal Limits  -> BCx 5/28: NG5D x 2 sets  -> VZV swab 5/29: negative  -> MRI brain with IV contrast 5/30: WNL  Denies headache, alert oriented.   - of note did clinically improve with starting empiric varicella treatment  - hold IV acyclovir 800mg q8hr x 14 days (last day June 11th) overnight, pending discussion with ID and nephro  - given rise in creatinine will need to continue aggressive hydration, s/p 3L NS in ED, start 125cc/hr NS  - goal urine output above 75 mL/hour  - complete recovery typically occurs within four to nine days after acyclovir is discontinued, will aggressively hydrate for now. No new neurologic findings.     Plan   - airborne and contact precautions  - ID and nephro consulted appreciate recs

## 2025-06-08 NOTE — CONSULT NOTE ADULT - ASSESSMENT
29y male  consulted for rule out of eye involvement in the setting of herpes zoster  -No corneal lesions seen  retina flat, no ischemia, vasculitis or vitritis    OK to discharge per ophtho   IV antiviral per ID      Outpatient follow-up: Patient should follow-up with his/her ophthalmologist or with [facility] Department of Ophthalmology upon discharge at the address below     [facility]  [address] [place] [id number] [phone]

## 2025-06-08 NOTE — PROGRESS NOTE ADULT - ASSESSMENT
29M w/ hx anxiety but otherwise no medical history, who was admitted to [facility] from 5/28-31 after ~1 week prior developed an outbreak of vesicles along R forehead which did not improve on PO valtrex (did see ophtho w/ no e/o eye involvement). On admission he also had sx of meningitis (photophobia, HA, neck stiffness, vomiting, ataxia, word finding difficulties). LP showed mostly normal CSF, TNC 0, slightly high prot 55, CSF PCR neg. A swab of R forehead lesions was negative for HSV/VZV. MRI brain w/wo contrast negative. HIV screen negative. His sx improved on IV acyclovir, and he was discharged on 5/31 with plan to complete a 14 day course of acyclovir 10mg/kg IV q8h (EOT 6/11) for empiric tx of VZV with CNS involvement. With this treatment his symptoms continued to improve at home, rash crusted, and all neuro sx above improved to near resolution. He then came to ED on 6/4 with redness of R eye without any visual changes/floaters/eye pain – thought to be a viral conjunctivitis, and eye redness subsequently improved. He then returned to ED on 6/6 with acute onset of bilateral flank pain and labs showed an acute rise in SCr from ~1 -> 2 and now up to 2.74. UA bland. CT renal stone hunt negative. Admitted for further management. Acyclovir was held on 6/6 (while in ED and being admitted). On 6/7 we gave a dose of ganciclovir, and today we are resuming IV acyclovir as below. Appreciate ophtho evaluation on 6/8 - exam negative for ocular infection.    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 Give as slow infusion over 2 hours, with aggressive IVF (discussed with nephrology fellow who is in agreement), targeting UOP of 100cc/hr.  - Regarding duration of therapy, will consider today day 10 of therapy (not counting 6/6 when he did not receive antivirals). Given symptom resolution, and no concerning findings on ophtho exam, and in the context of severe treatment side effects, will plan to stop IV acyclovir tomorrow (treatment for VZV encephalitis is 10-14 days, so he will have received adequate therapy already), and transition to PO valtrex to complete the originally planned 14 day course (EOT now 6/12).     ID Team 1 will follow 29M w/ hx anxiety but otherwise no medical history, who was admitted to [facility] from 5/28-31 after ~1 week prior developed an outbreak of vesicles along R forehead which did not improve on PO valtrex (did see ophtho w/ no e/o eye involvement). On admission he also had sx of meningitis (photophobia, HA, neck stiffness, vomiting, ataxia, word finding difficulties). LP showed mostly normal CSF, TNC 0, slightly high prot 55, CSF PCR neg. A swab of R forehead lesions was negative for HSV/VZV. MRI brain w/wo contrast negative. HIV screen negative. His sx improved on IV acyclovir, and he was discharged on 5/31 with plan to complete a 14 day course of acyclovir 10mg/kg IV q8h (EOT 6/11) for empiric tx of VZV with CNS involvement. With this treatment his symptoms continued to improve at home, rash crusted, and all neuro sx above improved to near resolution. He then came to ED on 6/4 with redness of R eye without any visual changes/floaters/eye pain – thought to be a viral conjunctivitis, and eye redness subsequently improved. He then returned to ED on 6/6 with acute onset of bilateral flank pain and labs showed an acute rise in SCr from ~1 -> 2 and now up to 2.74. UA bland. CT renal stone hunt negative. Admitted for further management. Acyclovir was held on 6/6 (while in ED and being admitted). On 6/7 we gave a dose of ganciclovir, and today we are resuming IV acyclovir as below. Appreciate ophtho evaluation on 6/8 - exam negative for ocular infection.    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 Give as slow infusion over 2 hours, with aggressive IVF (discussed with nephrology fellow who is in agreement), targeting UOP of 100cc/hr.  - Regarding duration of therapy, will consider today day 10 of therapy (not counting 6/6 when he did not receive antivirals). Given symptom resolution, and no concerning findings on ophtho exam, and in the context of severe treatment side effects, will plan to stop IV acyclovir tomorrow (treatment for VZV encephalitis is 10-14 days, so he will have received adequate therapy already), and transition to PO valtrex to complete the originally planned 14 day course (EOT now 6/12).   - Repeat CBC and BMP in the AM    ID Team 1 will follow

## 2025-06-08 NOTE — CONSULT NOTE ADULT - SUBJECTIVE AND OBJECTIVE BOX
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 Was dc on IV acyclovir via picc, now returning with flank pain CONCEPCIÓN. Suspected Acyclovir Nephrotoxicity(intratubular precipitation).       PAST MEDICAL & SURGICAL HISTORY:  Anxiety    Allergies:  No Known Allergies    Home Medications Reviewed  Hospital Medications:   MEDICATIONS  (STANDING):  acetaminophen     Tablet .. 650 milliGRAM(s) Oral every 6 hours  diazepam    Tablet 2 milliGRAM(s) Oral every 12 hours  ganciclovir IVPB 200 milliGRAM(s) IV Intermittent daily  polyethylene glycol 3350 17 Gram(s) Oral daily  senna 2 Tablet(s) Oral at bedtime  sodium chloride 0.9%. 1000 milliLiter(s) (125 mL/Hr) IV Continuous <Continuous>    SOCIAL HISTORY:  Denies ETOH,Smoking,   FAMILY HISTORY:      REVIEW OF SYSTEMS:  All other review of systems is negative unless indicated above.    VITALS:  Vital Signs Last 24 Hrs  T(C): 36.7 (07 Jun 2025 12:00), Max: 36.7 (06 Jun 2025 21:50)  T(F): 98 (07 Jun 2025 12:00), Max: 98.1 (06 Jun 2025 21:50)  HR: 63 (07 Jun 2025 12:00) (63 - 69)  BP: 124/67 (07 Jun 2025 12:00) (124/67 - 133/89)  BP(mean): --  RR: 18 (07 Jun 2025 12:00) (16 - 18)  SpO2: 98% (07 Jun 2025 12:00) (97% - 99%)    Parameters below as of 07 Jun 2025 12:00  Patient On (Oxygen Delivery Method): room air        06-06 @ 07:01  -  06-07 @ 07:00  --------------------------------------------------------  IN: 0 mL / OUT: 750 mL / NET: -750 mL        PHYSICAL EXAM:  Constitutional: NAD  Neck: No JVD  Respiratory: CTAB, no wheezes, rales or rhonchi  Cardiovascular: S1, S2, RRR  Gastrointestinal: ND/NT, +BS  Extremities:  No peripheral edema in LEs.   Neurological: A/O x 3, no focal motor deficits.   : No marino.       LABS:  06-07    141  |  108  |  20  ----------------------------<  93  4.4   |  27  |  2.74[H]    Ca    8.2[L]      07 Jun 2025 08:27  Phos  4.6     06-07  Mg     1.9     06-07    TPro  7.3  /  Alb  4.2  /  TBili  0.4  /  DBili      /  AST  20  /  ALT  25  /  AlkPhos  71  06-06    Creatinine Trend: 2.74 <--, 2.48 <--, 2.13 <--, 1.92 <--, 1.10 <--, 0.92 <--, 0.95 <--, 1.13 <--, 1.03 <--                        11.4   7.11  )-----------( 154      ( 07 Jun 2025 08:27 )             34.4     Urine Studies:  Urinalysis Basic - ( 07 Jun 2025 08:27 )    Color:  / Appearance:  / SG:  / pH:   Gluc: 93 mg/dL / Ketone:   / Bili:  / Urobili:    Blood:  / Protein:  / Nitrite:    Leuk Esterase:  / RBC:  / WBC    Sq Epi:  / Non Sq Epi:  / Bacteria:       Sodium, Random Urine: 61 mmol/L (06-07 @ 08:18)  Creatinine, Random Urine: 46 mg/dL (06-07 @ 08:18)  Protein/Creatinine Ratio Calculation: 0.3 Ratio (06-07 @ 08:18)  Osmolality, Random Urine: 219 mosm/kg (06-07 @ 08:18)  Potassium, Random Urine: 12 mmol/L (06-07 @ 08:18)          RADIOLOGY & ADDITIONAL STUDIES: Reviewed.                 
[facility] DEPARTMENT OF OPHTHALMOLOGY - INITIAL ADULT CONSULT  -----------------------------------------------------------------------------------------------------------  Ben Laureano MD  -----------------------------------------------------------------------------------------------------------    HPI:    HPI:   29y M w/ PMHx of anxiety, recently dx herpes zoster in R V1 dermatome, recently admitted for viral meningitis, and discharged 2 days ago with picc line in place for acyclovir, who is now presenting with an acute onset of left sided flank pain, severe, sharp, radiating into Left side and left lower quadrant. Denies testicular pain hematuria or dysuria, fever or chills nausea vomiting or diarrhea.  Denies prior history of kidney stone.  Denies recent trauma.  Denies headache or midline spinal pain.    Back on 5/28/25, he presented with multiple days of confusion, headache, ataxia and speech difficulties, noted to have Right sided scalp lesions concerning for zoster. Initially he was discharged with valtrex.  However noted no improvement and continued to have above neurological findings.  Returned on 5/29 and admitted, underwent an LP in the ER and PCR was negative.  MRI Brain on 5/29 was negative for acute infectious process or malignancy. He was continued on acyclovir 10 mg/kg IV every 8 hours and ID was consulted. ID recommended to continue IV acyclovir 800mg every 8 hours x 14 days (last day June 11th) and to place a PICC for discharge, as well as encouraged adequate IV hydration.     [place], blood cultures, CSF cultures all negative from prior admission.     In the ED:  Initial vital signs: T: 97.5F, HR: 81, BP: 128/87, R: 16, SpO2: 98% on RA  Labs: significant for WB 9.79, Hgb 12.5, Hct 37.4, MCV 89.5. Na 141, K 4.0, Cl 103, BUN 19, Cr 1.92 -> 2.13, eGFR 47-?42. Mag 1.9. Venous lactate 1.2, pH 7.49, pCO2 44. UA occasional bacteria.     Imaging:  CT stone hunt: 1.  No calculi or hydronephrosis.2.  Unchanged splenomegaly.  Medications: acetaminophen 1g IV, dilaudid 0.5 mg x1, toradol 15mg x2, morphine 4mg x2, NS 3L  Consults: ID  (06 Jun 2025 20:36)    Interval History no eye pain or light senstitivity at the moment     PAST MEDICAL & SURGICAL HISTORY:  Anxiety        Past Ocular History: denies surg/laser  Ophthalmic Medications: none  FAMILY HISTORY:   no glc/amd  Social History: no etoh/tobacco    MEDICATIONS  (STANDING):  acetaminophen     Tablet .. 650 milliGRAM(s) Oral every 6 hours  acyclovir IVPB 800 milliGRAM(s) IV Intermittent every 8 hours  acyclovir IVPB      diazepam    Tablet 2 milliGRAM(s) Oral every 12 hours  polyethylene glycol 3350 17 Gram(s) Oral daily  senna 2 Tablet(s) Oral at bedtime  sodium chloride 0.9%. 1000 milliLiter(s) (150 mL/Hr) IV Continuous <Continuous>    MEDICATIONS  (PRN):  aluminum hydroxide/magnesium hydroxide/simethicone Suspension 30 milliLiter(s) Oral every 4 hours PRN Dyspepsia  HYDROmorphone   Tablet 2 milliGRAM(s) Oral every 4 hours PRN Moderate Pain (4 - 6)  HYDROmorphone   Tablet 4 milliGRAM(s) Oral every 4 hours PRN Severe Pain (7 - 10)  melatonin 3 milliGRAM(s) Oral at bedtime PRN Insomnia  ondansetron Injectable 4 milliGRAM(s) IV Push every 8 hours PRN Nausea and/or Vomiting    Allergies & Intolerances:   No Known Allergies    Review of Systems:  Constitutional: No fever, chills  Eyes: No blurry vision, flashes, floaters, FBS, erythema, discharge, double vision, OU  Neuro: No tremors  Cardiovascular: No chest pain, palpitations  Respiratory: No SOB, no cough  GI: No nausea, vomiting, abdominal pain  : No dysuria  Skin: no rash  Psych: no depression  Endocrine: no polyuria, polydipsia  Heme/lymph: no swelling    VITALS: T(C): 36.5 (06-08-25 @ 06:01)  T(F): 97.7 (06-08-25 @ 06:01), Max: 98 (06-07-25 @ 12:00)  HR: 60 (06-08-25 @ 06:01) (60 - 63)  BP: 113/75 (06-08-25 @ 06:01) (113/75 - 135/81)  RR:  (18 - 18)  SpO2:  (97% - 98%)  Wt(kg): --  General: AAO x 3, appropriate mood and affect    Ophthalmology Exam:  Visual acuity (cc): 20/20 OD, 20/20 OS  Pupils: PERRL OU, no APD  Ttono: 16 OU  Extraocular movements (EOMs): Full OU, no pain, no diplopia  Confrontational Visual Field (CVF): Full OU  Color Plates: 12/12 OU    Pen Light Exam (PLE)  External: Flat OU  Lids/Lashes/Lacrimal Ducts: Flat OU    Sclera/Conjunctiva: W+Q OU  Cornea: Cl OU  Anterior Chamber: D+F OU    Iris: Flat OU  Lens: Cl OU    Fundus Exam: dilated with 1% tropicamide and 2.5% phenylephrine  Approval obtained from primary team for dilation  Patient aware that pupils can remained dilated for at least 4-6 hours  Exam performed with 20D lens    Vitreous: wnl OU  Disc, cup/disc: sharp and pink, 0.4 OU  Macula: wnl OU  Vessels: wnl OU  Periphery: wnl OU    
INFECTIOUS DISEASES INITIAL CONSULT NOTE    HPI:    HPI:   29M w/ hx anxiety but otherwise no medical history, who was admitted to [facility] from 5/28-31 after ~1 week prior developed an outbreak of vesicles along R forehead which did not improve on PO valtrex (did see ophtho w/ no e/o eye involvement). On admission he also had sx of meningitis (photophobia, HA, neck stiffness, vomiting, ataxia, word finding difficulties). LP showed mostly normal CSF, TNC 0, slightly high prot 55, CSF PCR neg. A swab of R forehead lesions was negative for HSV/VZV. MRI brain w/wo contrast negative. HIV screen negative. His sx improved on IV acyclovir, and he was discharged on 5/31 with plan to complete a 14 day course of acyclovir 10mg/kg IV q8h (EOT 6/11) for empiric tx of VZV with CNS involvement. With this treatment his symptoms continued to improve at home, rash crusted, and all neuro sx above improved to near resolution. He then came to ED on 6/4 with redness of R eye without any visual changes/floaters/eye pain – thought to be a viral conjunctivitis, and eye redness subsequently improved. He then returned to ED on 6/6 with acute onset of bilateral flank pain and labs showed an acute rise in SCr from ~1 -> 2 and now up to 2.74. UA bland. CT renal stone hunt negative. I was called by 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     On interview this morning patient reported a mild recurrence of his R sided headache involving supraorbital region of forehead, and R scalp. No visual symptoms. No neck stiffness. No recurrence of word finding difficulty/ataxic sx. His b/l flank pain has persisted.        PAST MEDICAL & SURGICAL HISTORY:  Anxiety            Review of Systems:   Constitutional, eyes, ENT, cardiovascular, respiratory, gastrointestinal, genitourinary, integumentary, neurological, psychiatric and heme/lymph are otherwise negative other than noted above       ANTIBIOTICS:  MEDICATIONS  (STANDING):  acetaminophen     Tablet .. 650 milliGRAM(s) Oral every 6 hours  diazepam    Tablet 2 milliGRAM(s) Oral every 12 hours  ganciclovir IVPB 200 milliGRAM(s) IV Intermittent daily  polyethylene glycol 3350 17 Gram(s) Oral daily  senna 2 Tablet(s) Oral at bedtime  sodium chloride 0.9%. 1000 milliLiter(s) (125 mL/Hr) IV Continuous <Continuous>    MEDICATIONS  (PRN):  aluminum hydroxide/magnesium hydroxide/simethicone Suspension 30 milliLiter(s) Oral every 4 hours PRN Dyspepsia  HYDROmorphone   Tablet 2 milliGRAM(s) Oral every 4 hours PRN Moderate Pain (4 - 6)  HYDROmorphone   Tablet 4 milliGRAM(s) Oral every 4 hours PRN Severe Pain (7 - 10)  melatonin 3 milliGRAM(s) Oral at bedtime PRN Insomnia  ondansetron Injectable 4 milliGRAM(s) IV Push every 8 hours PRN Nausea and/or Vomiting      Allergies    No Known Allergies    Intolerances        SOCIAL HISTORY:    FAMILY HISTORY:   no FH leading to current infection    Vital Signs Last 24 Hrs  T(C): 36.7 (07 Jun 2025 12:00), Max: 36.7 (06 Jun 2025 17:17)  T(F): 98 (07 Jun 2025 12:00), Max: 98.1 (06 Jun 2025 21:50)  HR: 63 (07 Jun 2025 12:00) (62 - 69)  BP: 124/67 (07 Jun 2025 12:00) (124/67 - 135/87)  BP(mean): --  RR: 18 (07 Jun 2025 12:00) (16 - 18)  SpO2: 98% (07 Jun 2025 12:00) (97% - 100%)    Parameters below as of 07 Jun 2025 12:00  Patient On (Oxygen Delivery Method): room air        06-06-25 @ 07:01  -  06-07-25 @ 07:00  --------------------------------------------------------  IN: 0 mL / OUT: 750 mL / NET: -750 mL        PHYSICAL EXAM:  Constitutional: alert, NAD, non-toxic, fluent speech.  Eyes: the sclera and conjunctiva were normal. Visual fields in each eye are grossly intact.  ENT: the ears and nose were normal in appearance.   Neck: no neck stiffness  Pulmonary: no respiratory distress  Heart: heart rate was normal and rhythm regular  Abdomen: soft, non-tender  Neurological: no focal deficits.   Psychiatric: the affect was normal  Skin: crusted/healed rash of R scalp      LABS:                        11.4   7.11  )-----------( 154      ( 07 Jun 2025 08:27 )             34.4     06-07    141  |  108  |  20  ----------------------------<  93  4.4   |  27  |  2.74[H]    Ca    8.2[L]      07 Jun 2025 08:27  Phos  4.6     06-07  Mg     1.9     06-07    TPro  7.3  /  Alb  4.2  /  TBili  0.4  /  DBili  x   /  AST  20  /  ALT  25  /  AlkPhos  71  06-06      Urinalysis Basic - ( 07 Jun 2025 08:27 )    Color: x / Appearance: x / SG: x / pH: x  Gluc: 93 mg/dL / Ketone: x  / Bili: x / Urobili: x   Blood: x / Protein: x / Nitrite: x   Leuk Esterase: x / RBC: x / WBC x   Sq Epi: x / Non Sq Epi: x / Bacteria: x        MICROBIOLOGY:  Reviewed    RADIOLOGY & ADDITIONAL STUDIES:  Reviewed

## 2025-06-08 NOTE — PROGRESS NOTE ADULT - PROBLEM SELECTOR PLAN 1
On admission Cr: 1.92 ->2.13 (baseline 0.9-1.1) -> 2.74 this morning. 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  - Obtain bladder scan to rule out obstruction  - renal U/S  - hold IV acyclovir 800mg q8hr x 14 days (last day June 11th) overnight, pending discussion with ID and nephro  - Continue to trend Cr, 10pm bmp  - Avoid nephrotoxic agents as able  - Adjust medication dosages for level of renal function  - 125cc/hr NS, strict ins and outs  - rest of plan as above On admission Cr: 1.92 ->2.13 (baseline 0.9-1.1) -> 2.74 this morning. 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  - Bladder scan w/o sings of obstruction   - Patient urinating well: net -2L over 24hrs       - renal U/S  - switch to Gancyclovir 200mg given concern for crystal nephropathy   - Continue to trend Cr,   - Avoid nephrotoxic agents as able  - Adjust medication dosages for level of renal function  - increase fluid rate to 150cc/hr NS, strict ins and outs On admission Cr: 1.92 ->2.13 (baseline 0.9-1.1) -> 2.74 this morning. 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  - Bladder scan w/o sings of obstruction   - Patient urinating well: net -2L over 24hrs       Plan   - restart Acyclovir 800mg Q8, infuse over 2 hours   - Continue to trend Cr,   - aggressive IVF, target urine output at least 100cc/hr   - Avoid nephrotoxic agents as able  - Adjust medication dosages for level of renal function  - increase fluid rate to 150cc/hr NS, strict ins and outs

## 2025-06-09 VITALS
TEMPERATURE: 98 F | OXYGEN SATURATION: 98 % | RESPIRATION RATE: 18 BRPM | HEART RATE: 74 BPM | DIASTOLIC BLOOD PRESSURE: 86 MMHG | SYSTOLIC BLOOD PRESSURE: 139 MMHG

## 2025-06-09 LAB
ALBUMIN SERPL ELPH-MCNC: 3.9 G/DL — SIGNIFICANT CHANGE UP (ref 3.3–5)
ALP SERPL-CCNC: 57 U/L — SIGNIFICANT CHANGE UP (ref 40–120)
ALT FLD-CCNC: 16 U/L — SIGNIFICANT CHANGE UP (ref 10–45)
ANION GAP SERPL CALC-SCNC: 10 MMOL/L — SIGNIFICANT CHANGE UP (ref 5–17)
AST SERPL-CCNC: 17 U/L — SIGNIFICANT CHANGE UP (ref 10–40)
BASOPHILS # BLD AUTO: 0.04 K/UL — SIGNIFICANT CHANGE UP (ref 0–0.2)
BASOPHILS NFR BLD AUTO: 0.8 % — SIGNIFICANT CHANGE UP (ref 0–2)
BILIRUB SERPL-MCNC: 0.2 MG/DL — SIGNIFICANT CHANGE UP (ref 0.2–1.2)
BUN SERPL-MCNC: 13 MG/DL — SIGNIFICANT CHANGE UP (ref 7–23)
CALCIUM SERPL-MCNC: 8.9 MG/DL — SIGNIFICANT CHANGE UP (ref 8.4–10.5)
CHLORIDE SERPL-SCNC: 107 MMOL/L — SIGNIFICANT CHANGE UP (ref 96–108)
CO2 SERPL-SCNC: 25 MMOL/L — SIGNIFICANT CHANGE UP (ref 22–31)
CREAT SERPL-MCNC: 1.41 MG/DL — HIGH (ref 0.5–1.3)
EGFR: 69 ML/MIN/1.73M2 — SIGNIFICANT CHANGE UP
EGFR: 69 ML/MIN/1.73M2 — SIGNIFICANT CHANGE UP
EOSINOPHIL # BLD AUTO: 0.26 K/UL — SIGNIFICANT CHANGE UP (ref 0–0.5)
EOSINOPHIL NFR BLD AUTO: 5.2 % — SIGNIFICANT CHANGE UP (ref 0–6)
GLUCOSE SERPL-MCNC: 105 MG/DL — HIGH (ref 70–99)
HCT VFR BLD CALC: 34.6 % — LOW (ref 39–50)
HGB BLD-MCNC: 11.6 G/DL — LOW (ref 13–17)
IMM GRANULOCYTES NFR BLD AUTO: 0.4 % — SIGNIFICANT CHANGE UP (ref 0–0.9)
LYMPHOCYTES # BLD AUTO: 1.05 K/UL — SIGNIFICANT CHANGE UP (ref 1–3.3)
LYMPHOCYTES # BLD AUTO: 21.2 % — SIGNIFICANT CHANGE UP (ref 13–44)
MAGNESIUM SERPL-MCNC: 2 MG/DL — SIGNIFICANT CHANGE UP (ref 1.6–2.6)
MCHC RBC-ENTMCNC: 31.4 PG — SIGNIFICANT CHANGE UP (ref 27–34)
MCHC RBC-ENTMCNC: 33.5 G/DL — SIGNIFICANT CHANGE UP (ref 32–36)
MCV RBC AUTO: 93.8 FL — SIGNIFICANT CHANGE UP (ref 80–100)
MONOCYTES # BLD AUTO: 0.48 K/UL — SIGNIFICANT CHANGE UP (ref 0–0.9)
MONOCYTES NFR BLD AUTO: 9.7 % — SIGNIFICANT CHANGE UP (ref 2–14)
NEUTROPHILS # BLD AUTO: 3.11 K/UL — SIGNIFICANT CHANGE UP (ref 1.8–7.4)
NEUTROPHILS NFR BLD AUTO: 62.7 % — SIGNIFICANT CHANGE UP (ref 43–77)
NRBC BLD AUTO-RTO: 0 /100 WBCS — SIGNIFICANT CHANGE UP (ref 0–0)
PHOSPHATE SERPL-MCNC: 3.6 MG/DL — SIGNIFICANT CHANGE UP (ref 2.5–4.5)
PLATELET # BLD AUTO: 160 K/UL — SIGNIFICANT CHANGE UP (ref 150–400)
POTASSIUM SERPL-MCNC: 4.2 MMOL/L — SIGNIFICANT CHANGE UP (ref 3.5–5.3)
POTASSIUM SERPL-SCNC: 4.2 MMOL/L — SIGNIFICANT CHANGE UP (ref 3.5–5.3)
PROT SERPL-MCNC: 6 G/DL — SIGNIFICANT CHANGE UP (ref 6–8.3)
RBC # BLD: 3.69 M/UL — LOW (ref 4.2–5.8)
RBC # FLD: 12.7 % — SIGNIFICANT CHANGE UP (ref 10.3–14.5)
SODIUM SERPL-SCNC: 142 MMOL/L — SIGNIFICANT CHANGE UP (ref 135–145)
WBC # BLD: 4.96 K/UL — SIGNIFICANT CHANGE UP (ref 3.8–10.5)
WBC # FLD AUTO: 4.96 K/UL — SIGNIFICANT CHANGE UP (ref 3.8–10.5)

## 2025-06-09 PROCEDURE — 84133 ASSAY OF URINE POTASSIUM: CPT

## 2025-06-09 PROCEDURE — 81003 URINALYSIS AUTO W/O SCOPE: CPT

## 2025-06-09 PROCEDURE — 81001 URINALYSIS AUTO W/SCOPE: CPT

## 2025-06-09 PROCEDURE — 99239 HOSP IP/OBS DSCHRG MGMT >30: CPT | Mod: GC

## 2025-06-09 PROCEDURE — 83690 ASSAY OF LIPASE: CPT

## 2025-06-09 PROCEDURE — 85027 COMPLETE CBC AUTOMATED: CPT

## 2025-06-09 PROCEDURE — 99285 EMERGENCY DEPT VISIT HI MDM: CPT

## 2025-06-09 PROCEDURE — 84300 ASSAY OF URINE SODIUM: CPT

## 2025-06-09 PROCEDURE — 99232 SBSQ HOSP IP/OBS MODERATE 35: CPT

## 2025-06-09 PROCEDURE — 83605 ASSAY OF LACTIC ACID: CPT

## 2025-06-09 PROCEDURE — 84156 ASSAY OF PROTEIN URINE: CPT

## 2025-06-09 PROCEDURE — 85025 COMPLETE CBC W/AUTO DIFF WBC: CPT

## 2025-06-09 PROCEDURE — 80048 BASIC METABOLIC PNL TOTAL CA: CPT

## 2025-06-09 PROCEDURE — 84100 ASSAY OF PHOSPHORUS: CPT

## 2025-06-09 PROCEDURE — 82570 ASSAY OF URINE CREATININE: CPT

## 2025-06-09 PROCEDURE — 82803 BLOOD GASES ANY COMBINATION: CPT

## 2025-06-09 PROCEDURE — 86901 BLOOD TYPING SEROLOGIC RH(D): CPT

## 2025-06-09 PROCEDURE — 83735 ASSAY OF MAGNESIUM: CPT

## 2025-06-09 PROCEDURE — 96376 TX/PRO/DX INJ SAME DRUG ADON: CPT

## 2025-06-09 PROCEDURE — 86850 RBC ANTIBODY SCREEN: CPT

## 2025-06-09 PROCEDURE — 83935 ASSAY OF URINE OSMOLALITY: CPT

## 2025-06-09 PROCEDURE — 86900 BLOOD TYPING SEROLOGIC ABO: CPT

## 2025-06-09 PROCEDURE — 82550 ASSAY OF CK (CPK): CPT

## 2025-06-09 PROCEDURE — 74176 CT ABD & PELVIS W/O CONTRAST: CPT

## 2025-06-09 PROCEDURE — 84540 ASSAY OF URINE/UREA-N: CPT

## 2025-06-09 PROCEDURE — 96374 THER/PROPH/DIAG INJ IV PUSH: CPT

## 2025-06-09 PROCEDURE — 36415 COLL VENOUS BLD VENIPUNCTURE: CPT

## 2025-06-09 PROCEDURE — 96375 TX/PRO/DX INJ NEW DRUG ADDON: CPT

## 2025-06-09 PROCEDURE — 80053 COMPREHEN METABOLIC PANEL: CPT

## 2025-06-09 PROCEDURE — 76775 US EXAM ABDO BACK WALL LIM: CPT

## 2025-06-09 RX ORDER — SODIUM CHLORIDE 9 G/1000ML
1000 INJECTION, SOLUTION INTRAVENOUS
Refills: 0 | Status: DISCONTINUED | OUTPATIENT
Start: 2025-06-09 | End: 2025-06-09

## 2025-06-09 RX ADMIN — Medication 650 MILLIGRAM(S): at 06:24

## 2025-06-09 RX ADMIN — DIAZEPAM 2 MILLIGRAM(S): 5 TABLET ORAL at 06:24

## 2025-06-09 RX ADMIN — SODIUM CHLORIDE 150 MILLILITER(S): 9 INJECTION, SOLUTION INTRAVENOUS at 12:44

## 2025-06-09 RX ADMIN — Medication 1000 MILLIGRAM(S): at 13:58

## 2025-06-09 RX ADMIN — Medication 133 MILLIGRAM(S): at 06:24

## 2025-06-09 RX ADMIN — Medication 650 MILLIGRAM(S): at 07:52

## 2025-06-09 NOTE — PROGRESS NOTE ADULT - PROVIDER SPECIALTY LIST ADULT
Infectious Disease
Nephrology
Infectious Disease
Internal Medicine

## 2025-06-09 NOTE — DISCHARGE NOTE PROVIDER - NSDCMRMEDTOKEN_GEN_ALL_CORE_FT
diazePAM 2 mg oral tablet: 1 tab(s) orally 2 times a day   diazePAM 2 mg oral tablet: 1 tab(s) orally 2 times a day  valACYclovir 1 g oral tablet: 1 tab(s) orally every 8 hours

## 2025-06-09 NOTE — PROGRESS NOTE ADULT - ATTENDING COMMENTS
Clinically improving - SCr normalizing, flank pain resolving. Clinically he had complete and rapid resolution of all prior sx of VZV infection/encephalitis early in acyclovir course, and symptoms remain entirely resolved at this point. Yesterday marked 10 days of IV acyclovir therapy  (not counting 6/6 when he did not receive antivirals). Given symptom resolution, and no concerning findings on ophtho exam, and in the context of severe treatment side effects, will stop IV acyclovir tomorrow (treatment for VZV encephalitis is 10-14 days, so he will have received adequate therapy already), and transition to PO valtrex to complete the originally planned 14 day course (EOT now 6/12 to make up for missed day on 6/6). Will arrange outpatient ID follow up with myself, and gave patient strict ED precautions for any recurrent flank pain, oliguria, or for any recurrent symptoms of VZV CNS infection (we reviewed these).
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    Plan  f/u renal u/s; c/w IV fluids, appreciate renal recs  avoid nephrotoxins  acyclovir d/c'd, starting ganciclovir, ID recs appreciated
29y M w/ a PMHx of anxiety and recent admission for likely varicella meningitis/herpes zoster R V1 dermatome started on IV acyclovir via picc and discharged, now returning for flank pain and a rise in creatinine concerning for crystalline induce COCNEPCIÓN iso acyclovir use.    Plan  restarting acyclovir 800q8, infusing over 2 hours; ID recs appreciated  c/w IVF 150cc/hr; UOP: 2.2L in 24 hours; strict I/Os  avoid nephrotoxins; nephrology following  patient describes visual disturbance; opthalmology consulted

## 2025-06-09 NOTE — DISCHARGE NOTE NURSING/CASE MANAGEMENT/SOCIAL WORK - FINANCIAL ASSISTANCE
[facility] provides services at a reduced cost to those who are determined to be eligible through [facility]’s financial assistance program. Information regarding [facility]’s financial assistance program can be found by going to [email or web address] or by calling [phone].

## 2025-06-09 NOTE — PROGRESS NOTE ADULT - PROBLEM SELECTOR PLAN 3
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 Unlikely a surgical abdomen picture, lack of rebound guarding. No white count to suggest infective process. Lipase normal.  CT A/P without acute findings which is reassuring.   - serial abdominal exams  - EKG  - Tylenol 650mg PO q6 hr standing, hydromorphone 2mg q4hr prn for moderate pain, hydromorphone 4mg q4hr prn for severe pain  - bowel regimen while on pain meds

## 2025-06-09 NOTE — PROGRESS NOTE ADULT - SUBJECTIVE AND OBJECTIVE BOX
NEPHROLOGY PROGRESS NOTE:    Interval history:  No overnight events. Patient seen and examined at bedside. No active complaints    Vitals:  T(F): 97.6 (25 @ 12:09), Max: 97.8 (25 @ 22:32)  HR: 74 (25 @ 12:09)  BP: 139/86 (25 @ 12:09)  RR: 18 (25 @ 12:09)  SpO2: 98% (25 @ 12:09)  Wt(kg): --     @ 07:01  -   @ 07:00  --------------------------------------------------------  IN: 0 mL / OUT: 2225 mL / NET: -2225 mL     @ 07:01  -   @ 07:00  --------------------------------------------------------  IN: 250 mL / OUT: 4100 mL / NET: -3850 mL          PE:  General: Not in acute distress   Chest: CTAP b/l, no use of accessory respiratory muscles  Heart: RRR, S1/S2 wnl, no MRG  Abdomen: Soft, nontender, nondistended   Extremities: No edema  Neuro:  Alert, no apparent focal deficits, answer questions appropriately      Pertinent labs & Imagin-09    142  |  107  |  13  ----------------------------<  105[H]  4.2   |  25  |  1.41[H]    Ca    8.9      2025 05:30  Phos  3.6       Mg     2.0         TPro  6.0  /  Alb  3.9  /  TBili  0.2  /  DBili      /  AST  17  /  ALT  16  /  AlkPhos  57                            11.6   4.96  )-----------( 160      ( 2025 05:30 )             34.6       Urine Studies:  Creatinine Trend: 1.41<--, 1.92<--, 2.15<--, 2.24<--, 2.74<--, 2.48<--  Urinalysis Basic - ( 2025 05:30 )    Color:  / Appearance:  / SG:  / pH:   Gluc: 105 mg/dL / Ketone:   / Bili:  / Urobili:    Blood:  / Protein:  / Nitrite:    Leuk Esterase:  / RBC:  / WBC    Sq Epi:  / Non Sq Epi:  / Bacteria:       Sodium, Random Urine: 61 mmol/L ( @ 08:18)  Creatinine, Random Urine: 46 mg/dL ( @ 08:18)  Protein/Creatinine Ratio Calculation: 0.3 Ratio ( @ 08:18)  Osmolality, Random Urine: 219 mosm/kg ( @ 08:18)  Potassium, Random Urine: 12 mmol/L ( @ 08:18)      MEDICATIONS  (STANDING):  acetaminophen     Tablet .. 650 milliGRAM(s) Oral every 6 hours  diazepam    Tablet 2 milliGRAM(s) Oral every 12 hours  lactated ringers. 1000 milliLiter(s) (150 mL/Hr) IV Continuous <Continuous>  polyethylene glycol 3350 17 Gram(s) Oral daily  senna 2 Tablet(s) Oral at bedtime  valACYclovir 1000 milliGRAM(s) Oral three times a day    MEDICATIONS  (PRN):  aluminum hydroxide/magnesium hydroxide/simethicone Suspension 30 milliLiter(s) Oral every 4 hours PRN Dyspepsia  HYDROmorphone   Tablet 2 milliGRAM(s) Oral every 4 hours PRN Moderate Pain (4 - 6)  HYDROmorphone   Tablet 4 milliGRAM(s) Oral every 4 hours PRN Severe Pain (7 - 10)  melatonin 3 milliGRAM(s) Oral at bedtime PRN Insomnia  ondansetron Injectable 4 milliGRAM(s) IV Push every 8 hours PRN Nausea and/or Vomiting

## 2025-06-09 NOTE — DISCHARGE NOTE PROVIDER - NSDCCPCAREPLAN_GEN_ALL_CORE_FT
PRINCIPAL DISCHARGE DIAGNOSIS  Diagnosis: CONCEPCIÓN (acute kidney injury)  Assessment and Plan of Treatment: Acute kidney injury is when the kidney function worsens. Normally, the kidneys filter the blood and remove waste and excess salt and water. The word "acute" means sudden. Another term for acute kidney injury is "acute kidney failure." Acute kidney injury can have different causes. It can happen when there is less blood flow than usual to the kidneys, when the kidneys get damaged (from infections, cancer, certain medications, or autoimmune conditions), or when the path for urine to leave the body is blocked (a common example is prostate enlargement in men). Some people do not have any symptoms at first. People who are in the hospital might learn that they have acute kidney injury after they have blood tests for another reason. When people do have symptoms, the symptoms can include: urinating less or not at all, blood in the urine, swelling in the legs, vomiting, feeling weak, or acting confused. You had an acute kidney injury most likely due to injury from your medications. Your kidney function returned back to baseline during this hospital admission.        SECONDARY DISCHARGE DIAGNOSES  Diagnosis: Varicella zoster meningitis  Assessment and Plan of Treatment:      PRINCIPAL DISCHARGE DIAGNOSIS  Diagnosis: CONCEPCIÓN (acute kidney injury)  Assessment and Plan of Treatment: Acute kidney injury is when the kidney function worsens. Normally, the kidneys filter the blood and remove waste and excess salt and water. The word "acute" means sudden. Another term for acute kidney injury is "acute kidney failure." Acute kidney injury can have different causes. It can happen when there is less blood flow than usual to the kidneys, when the kidneys get damaged (from infections, cancer, certain medications, or autoimmune conditions), or when the path for urine to leave the body is blocked (a common example is prostate enlargement in men). Some people do not have any symptoms at first. People who are in the hospital might learn that they have acute kidney injury after they have blood tests for another reason. When people do have symptoms, the symptoms can include: urinating less or not at all, blood in the urine, swelling in the legs, vomiting, feeling weak, or acting confused. You had an acute kidney injury most likely due to injury from your medications. Your kidney function returned back to baseline during this hospital admission.        SECONDARY DISCHARGE DIAGNOSES  Diagnosis: Varicella zoster meningitis  Assessment and Plan of Treatment: Meningitis is an infection and inflammation of the protective layers around the brain and spinal cord, called the meninges. It can be caused by viruses, bacteria, or, less commonly, other germs. Some forms are mild, but others can be life-threatening and need urgent medical care.  Meningitis can be caused by bacteria or viruses. During your hospitalization it was determined that your illness was to due the herpes zoster virus for which you were treated with an antiviral medication (acyclovir). Please continue to take the medication as prescribed after leaving the hospital.   Return for any worsening fevers, headaches, changes in memory, vision changes.   Please continue to take Valacyclovir 1g every 8 hours for 4 days to finish out your 14 day total antiviral course (including previous medications).  Dr. Barlow (infectious diseas) office will reach out to you for further followup.

## 2025-06-09 NOTE — PROGRESS NOTE ADULT - ASSESSMENT
29M with PMH of recent admission for VZV meningitis with possible opthalmologic involvement. Was dc on IV acyclovir via picc, now returning with flank pain CONCEPCIÓN. Suspected Acyclovir Nephrotoxicity(intratubular precipitation).     Last 24h uop: 4.1 L, started on LR at 150 mL/h on 6/7  SBP 120s    -Imp: Non oliguric ATN   Highly suspected Acyclovir Nephrotoxicity causing intratubular precipitation.  [place] UA. SCr trending down 2.7 > 1.4  Now switched to PO Valtrex  Planned for discharge    -Plan:  Close Nephrology follow up

## 2025-06-09 NOTE — PROGRESS NOTE ADULT - TIME BILLING
Managing VZV encephalitis
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
The necessity of the time spent during the encounter on this date of service was due to:     coordination of care; preparing to see Pt.; interviewing Pt.; examining Pt.; reviewing labs and images; documentation in [facility]; d/w Medicine resident on rounds.

## 2025-06-09 NOTE — PROGRESS NOTE ADULT - SUBJECTIVE AND OBJECTIVE BOX
INFECTIOUS DISEASES CONSULT FOLLOW-UP NOTE    INTERVAL HPI/OVERNIGHT EVENTS:  Afebrile, b/l flank pain is much improved, resolved headache/R supraorbital pain. Denies dizziness. Passing stool.    ROS:   Constitutional, eyes, ENT, cardiovascular, respiratory, gastrointestinal, genitourinary, integumentary, neurological, psychiatric and heme/lymph are otherwise negative other than noted above       ANTIBIOTICS/RELEVANT:    MEDICATIONS  (STANDING):  acetaminophen     Tablet .. 650 milliGRAM(s) Oral every 6 hours  diazepam    Tablet 2 milliGRAM(s) Oral every 12 hours  lactated ringers. 1000 milliLiter(s) (150 mL/Hr) IV Continuous <Continuous>  polyethylene glycol 3350 17 Gram(s) Oral daily  senna 2 Tablet(s) Oral at bedtime  valACYclovir 1000 milliGRAM(s) Oral three times a day    MEDICATIONS  (PRN):  aluminum hydroxide/magnesium hydroxide/simethicone Suspension 30 milliLiter(s) Oral every 4 hours PRN Dyspepsia  HYDROmorphone   Tablet 2 milliGRAM(s) Oral every 4 hours PRN Moderate Pain (4 - 6)  HYDROmorphone   Tablet 4 milliGRAM(s) Oral every 4 hours PRN Severe Pain (7 - 10)  melatonin 3 milliGRAM(s) Oral at bedtime PRN Insomnia  ondansetron Injectable 4 milliGRAM(s) IV Push every 8 hours PRN Nausea and/or Vomiting        Vital Signs Last 24 Hrs  T(C): 36.4 (09 Jun 2025 12:09), Max: 36.6 (08 Jun 2025 22:32)  T(F): 97.6 (09 Jun 2025 12:09), Max: 97.8 (08 Jun 2025 22:32)  HR: 74 (09 Jun 2025 12:09) (67 - 74)  BP: 139/86 (09 Jun 2025 12:09) (130/84 - 139/86)  BP(mean): 99 (09 Jun 2025 06:00) (99 - 99)  RR: 18 (09 Jun 2025 12:09) (18 - 18)  SpO2: 98% (09 Jun 2025 12:09) (98% - 99%)    Parameters below as of 09 Jun 2025 12:09  Patient On (Oxygen Delivery Method): room air        06-08-25 @ 07:01  -  06-09-25 @ 07:00  --------------------------------------------------------  IN: 250 mL / OUT: 4100 mL / NET: -3850 mL      PHYSICAL EXAM:  Constitutional: alert, NAD, non-toxic, fluent speech.  Eyes: the sclera and conjunctiva were normal. Visual fields in each eye are grossly intact.  ENT: the ears and nose were normal in appearance.   Neck: no neck stiffness  Pulmonary: no respiratory distress  Heart: heart rate was normal and rhythm regular  Abdomen: soft, non-tender  Neurological: no focal deficits.   Psychiatric: the affect was normal  Skin: crusted/healed rash of R scalp        LABS:                        11.6   4.96  )-----------( 160      ( 09 Jun 2025 05:30 )             34.6     06-09    142  |  107  |  13  ----------------------------<  105[H]  4.2   |  25  |  1.41[H]    Ca    8.9      09 Jun 2025 05:30  Phos  3.6     06-09  Mg     2.0     06-09    TPro  6.0  /  Alb  3.9  /  TBili  0.2  /  DBili  x   /  AST  17  /  ALT  16  /  AlkPhos  57  06-09      Urinalysis Basic - ( 09 Jun 2025 05:30 )    Color: x / Appearance: x / SG: x / pH: x  Gluc: 105 mg/dL / Ketone: x  / Bili: x / Urobili: x   Blood: x / Protein: x / Nitrite: x   Leuk Esterase: x / RBC: x / WBC x   Sq Epi: x / Non Sq Epi: x / Bacteria: x        MICROBIOLOGY:  Reviewed    RADIOLOGY & ADDITIONAL STUDIES:  Reviewed   INFECTIOUS DISEASES CONSULT FOLLOW-UP NOTE    INTERVAL HPI/OVERNIGHT EVENTS:  Afebrile, b/l flank pain is much improved, resolved headache/R supraorbital pain. No new sx - speech/MSE normal. Denies dizziness. Passing stool.    ROS:   Constitutional, eyes, ENT, cardiovascular, respiratory, gastrointestinal, genitourinary, integumentary, neurological, psychiatric and heme/lymph are otherwise negative other than noted above       ANTIBIOTICS/RELEVANT:    MEDICATIONS  (STANDING):  acetaminophen     Tablet .. 650 milliGRAM(s) Oral every 6 hours  diazepam    Tablet 2 milliGRAM(s) Oral every 12 hours  lactated ringers. 1000 milliLiter(s) (150 mL/Hr) IV Continuous <Continuous>  polyethylene glycol 3350 17 Gram(s) Oral daily  senna 2 Tablet(s) Oral at bedtime  valACYclovir 1000 milliGRAM(s) Oral three times a day    MEDICATIONS  (PRN):  aluminum hydroxide/magnesium hydroxide/simethicone Suspension 30 milliLiter(s) Oral every 4 hours PRN Dyspepsia  HYDROmorphone   Tablet 2 milliGRAM(s) Oral every 4 hours PRN Moderate Pain (4 - 6)  HYDROmorphone   Tablet 4 milliGRAM(s) Oral every 4 hours PRN Severe Pain (7 - 10)  melatonin 3 milliGRAM(s) Oral at bedtime PRN Insomnia  ondansetron Injectable 4 milliGRAM(s) IV Push every 8 hours PRN Nausea and/or Vomiting        Vital Signs Last 24 Hrs  T(C): 36.4 (09 Jun 2025 12:09), Max: 36.6 (08 Jun 2025 22:32)  T(F): 97.6 (09 Jun 2025 12:09), Max: 97.8 (08 Jun 2025 22:32)  HR: 74 (09 Jun 2025 12:09) (67 - 74)  BP: 139/86 (09 Jun 2025 12:09) (130/84 - 139/86)  BP(mean): 99 (09 Jun 2025 06:00) (99 - 99)  RR: 18 (09 Jun 2025 12:09) (18 - 18)  SpO2: 98% (09 Jun 2025 12:09) (98% - 99%)    Parameters below as of 09 Jun 2025 12:09  Patient On (Oxygen Delivery Method): room air        06-08-25 @ 07:01  -  06-09-25 @ 07:00  --------------------------------------------------------  IN: 250 mL / OUT: 4100 mL / NET: -3850 mL      PHYSICAL EXAM:  Constitutional: alert, NAD, non-toxic, fluent speech.  Eyes: the sclera and conjunctiva were normal. Visual fields in each eye are grossly intact.  ENT: the ears and nose were normal in appearance.   Neck: no neck stiffness  Pulmonary: no respiratory distress  Heart: heart rate was normal and rhythm regular  Abdomen: soft, non-tender  Neurological: no focal deficits.   Psychiatric: the affect was normal  Skin: resolved rash of R scalp        LABS:                        11.6   4.96  )-----------( 160      ( 09 Jun 2025 05:30 )             34.6     06-09    142  |  107  |  13  ----------------------------<  105[H]  4.2   |  25  |  1.41[H]    Ca    8.9      09 Jun 2025 05:30  Phos  3.6     06-09  Mg     2.0     06-09    TPro  6.0  /  Alb  3.9  /  TBili  0.2  /  DBili  x   /  AST  17  /  ALT  16  /  AlkPhos  57  06-09      Urinalysis Basic - ( 09 Jun 2025 05:30 )    Color: x / Appearance: x / SG: x / pH: x  Gluc: 105 mg/dL / Ketone: x  / Bili: x / Urobili: x   Blood: x / Protein: x / Nitrite: x   Leuk Esterase: x / RBC: x / WBC x   Sq Epi: x / Non Sq Epi: x / Bacteria: x        MICROBIOLOGY:  Reviewed    RADIOLOGY & ADDITIONAL STUDIES:  Reviewed   INFECTIOUS DISEASES CONSULT FOLLOW-UP NOTE    INTERVAL HPI/OVERNIGHT EVENTS:  Afebrile, b/l flank pain is much improved, resolved headache/R supraorbital pain. No new sx - speech/MSE normal. Denies dizziness. Passing stool.    ROS:   Constitutional, eyes, ENT, cardiovascular, respiratory, gastrointestinal, genitourinary, integumentary, neurological, psychiatric and heme/lymph are otherwise negative other than noted above       ANTIBIOTICS/RELEVANT:    MEDICATIONS  (STANDING):  acetaminophen     Tablet .. 650 milliGRAM(s) Oral every 6 hours  diazepam    Tablet 2 milliGRAM(s) Oral every 12 hours  lactated ringers. 1000 milliLiter(s) (150 mL/Hr) IV Continuous <Continuous>  polyethylene glycol 3350 17 Gram(s) Oral daily  senna 2 Tablet(s) Oral at bedtime  valACYclovir 1000 milliGRAM(s) Oral three times a day    MEDICATIONS  (PRN):  aluminum hydroxide/magnesium hydroxide/simethicone Suspension 30 milliLiter(s) Oral every 4 hours PRN Dyspepsia  HYDROmorphone   Tablet 2 milliGRAM(s) Oral every 4 hours PRN Moderate Pain (4 - 6)  HYDROmorphone   Tablet 4 milliGRAM(s) Oral every 4 hours PRN Severe Pain (7 - 10)  melatonin 3 milliGRAM(s) Oral at bedtime PRN Insomnia  ondansetron Injectable 4 milliGRAM(s) IV Push every 8 hours PRN Nausea and/or Vomiting        Vital Signs Last 24 Hrs  T(C): 36.4 (09 Jun 2025 12:09), Max: 36.6 (08 Jun 2025 22:32)  T(F): 97.6 (09 Jun 2025 12:09), Max: 97.8 (08 Jun 2025 22:32)  HR: 74 (09 Jun 2025 12:09) (67 - 74)  BP: 139/86 (09 Jun 2025 12:09) (130/84 - 139/86)  BP(mean): 99 (09 Jun 2025 06:00) (99 - 99)  RR: 18 (09 Jun 2025 12:09) (18 - 18)  SpO2: 98% (09 Jun 2025 12:09) (98% - 99%)    Parameters below as of 09 Jun 2025 12:09  Patient On (Oxygen Delivery Method): room air        06-08-25 @ 07:01  -  06-09-25 @ 07:00  --------------------------------------------------------  IN: 250 mL / OUT: 4100 mL / NET: -3850 mL      PHYSICAL EXAM:  Constitutional: alert, NAD, non-toxic, fluent speech.  Eyes: the sclera and conjunctiva were normal. Visual fields in each eye are grossly intact.  ENT: the ears and nose were normal in appearance.   Neck: no neck stiffness  Pulmonary: no respiratory distress  Heart: heart rate was normal and rhythm regular  Abdomen: soft, non-tender  Neurological: no focal deficits.   Psychiatric: the affect was normal  Skin: resolved rash of R scalp  MSK: [facility] PIC c/d/i        LABS:                        11.6   4.96  )-----------( 160      ( 09 Jun 2025 05:30 )             34.6     06-09    142  |  107  |  13  ----------------------------<  105[H]  4.2   |  25  |  1.41[H]    Ca    8.9      09 Jun 2025 05:30  Phos  3.6     06-09  Mg     2.0     06-09    TPro  6.0  /  Alb  3.9  /  TBili  0.2  /  DBili  x   /  AST  17  /  ALT  16  /  AlkPhos  57  06-09      Urinalysis Basic - ( 09 Jun 2025 05:30 )    Color: x / Appearance: x / SG: x / pH: x  Gluc: 105 mg/dL / Ketone: x  / Bili: x / Urobili: x   Blood: x / Protein: x / Nitrite: x   Leuk Esterase: x / RBC: x / WBC x   Sq Epi: x / Non Sq Epi: x / Bacteria: x        MICROBIOLOGY:  Reviewed    RADIOLOGY & ADDITIONAL STUDIES:  Reviewed

## 2025-06-09 NOTE — PROGRESS NOTE ADULT - PROBLEM SELECTOR PLAN 5
F: 150cc/hr NS  E: Replete as needed  N: Regular diet  Dvt ppx: low improve, scds  GI ppx: ni  Activity: full  Code status: full

## 2025-06-09 NOTE — PROGRESS NOTE ADULT - PROBLEM SELECTOR PLAN 4
Known, multi year history of anxiety.  Denies SI/HI. Mood on exam appropriate.  Home medications: diazepam 2mg BID  - c home medications  - blinds up daily

## 2025-06-09 NOTE — PROGRESS NOTE ADULT - PROBLEM SELECTOR PLAN 1
OFFICE VISIT      Patient: Stefania Chacon   : 1964 MRN: [id number]    SUBJECTIVE:  Chief Complaint   Patient presents with   • Office Visit   • Dizziness   • Chest Pain       A 56 year old female presents today for chest pain and dizziness.     HISTORY OF PRESENT ILLNESS:    Chest pain:  Complains of chest pain for 2 weeks. The pain is present in between the sternum. Denies pain in the breast, radiating pain in the jaw or arm. This feeling is different from the anxiety. States she smokes, but has decreased than before. Inquires if smoking can cause chest pain. Underwent the CT neck scan and mammogram last year which were normal.     Depression with anxiety:  Follows up with the psychiatrist, Dr. King regularly. Takes Alprazolam, Wellbutrin 300 mg and Lamictal at morning. Denies taking Lurasidone.    Other insomnia:  Takes Ambien 10 mg without benefits.     Hx of colonoscopy:  Due for colonoscopy in 2021.     Rheumatoid arthritis:  On medications (Plaquenil). Follows up with the rheumatologist, Dr. Thomason.     Primary hypothyroidism:  On Levothyroxine.     Other hyperlipidemia:  On Fenofibrate, Lipitor and Vascepa. Is fasting today.     Anemia:  Complains of dizziness since 2 weeks.    Vitamin D deficiency:  Has completed the therapy of vitamin D supplements.     Additional comments:  Inquires about the Shingles and COVID vaccine.       PAST MEDICAL HISTORY:  Past Medical History:   Diagnosis Date   • Bipolar disorder (CMS/HCC)    • H/O tubal ligation    • H/O: hysterectomy    • Hx laparoscopic cholecystectomy    • Hyperlipemia    • Hypothyroid    • Rheumatoid arthritis (CMS/HCC)      MEDICATIONS:  Current Outpatient Medications   Medication Sig   • levothyroxine 112 MCG tablet Take 1 tablet by mouth daily.   • lamoTRIgine (LaMICtal) 25 MG tablet Take 1 tablet by mouth daily.   • zolpidem (AMBIEN) 10 MG tablet Take 1 tablet by mouth nightly as needed for Sleep.   • HYDROcodone-acetaminophen (NORCO)   MG per tablet Take 1 tablet by mouth every 8 hours as needed for Pain (and travel).   • fenofibrate 160 MG tablet Take 1 tablet by mouth daily.   • ALPRAZolam (XANAX) 1 MG tablet Take 1 tablet by mouth 3 times daily as needed for Anxiety.   • Vitamin D, Ergocalciferol, 1.25 mg (50,000 units) capsule Take 1 capsule by mouth once a week   • hydroxychloroquine (PLAQUENIL) 200 MG tablet Take 1 tablet by mouth 2 times daily.   • olopatadine (PATANOL) 0.1 % ophthalmic solution Place 1 drop into both eyes 2 times daily.   • levocetirizine (XYZAL) 5 MG tablet Take 1 tablet by mouth every evening.   • Icosapent Ethyl (VASCEPA) 1 g Cap Take 1 g by mouth 2 times daily.   • Multiple Vitamins-Iron (QC DAILY MULTIVITAMINS/IRON) Tab Take 1 tablet by mouth daily.   • atorvastatin (LIPITOR) 20 MG tablet Take 1 tablet by mouth daily.   • buPROPion (WELLBUTRIN XL) 300 MG 24 hr tablet Take 1 tablet by mouth daily.   • triamcinolone (ARISTOCORT) 0.1 % cream Apply topically 2 times daily.     No current facility-administered medications for this visit.      ALLERGIES:  ALLERGIES:   Allergen Reactions   • Clarithromycin Other (See Comments)   • Topamax Other (See Comments)     PAST SURGICAL HISTORY:  Past Surgical History:   Procedure Laterality Date   • Anes laparoscopic cholecystectomy     • Appendectomy     • Hysterectomy     • Tubal ligation       FAMILY HISTORY:  Family History   Problem Relation Age of Onset   • Cancer, Colon Maternal Grandfather      SOCIAL HISTORY:  Social History     Tobacco Use   Smoking Status Current Every Day Smoker   • Packs/day: 0.00   Smokeless Tobacco Never Used     Social History     Substance and Sexual Activity   Alcohol Use Yes    Comment: socially       Review of Systems    Constitutional: Per HPI.  Cardiovascular: Per HPI.  Neuro: Per HPI.  Psychiatric: Per HPI.    OBJECTIVE:  Vitals:    01/28/21 0944   BP: 120/64   Pulse: 77   Resp: 18   Temp: 98.6 °F (37 °C)   TempSrc: Oral   SpO2: 97%    Weight: 80.5 kg   Height: 5' 3\" (1.6 m)   PainSc:  0       Physical Exam    Constitutional: Alert, in no acute distress and current vital signs reviewed.   Head and Face: Atraumatic and normocephalic.   Eyes: No discharge, no eyelid swelling and the sclerae were normal.   ENT:  Oropharynx normal. Normal appearing outer ear. Tympanic membranes are bilaterally clear, normal appearing nose and normal lips.   Neck: Normal appearing neck and supple neck.   Pulmonary: Breath sounds clear to auscultation bilaterally, but no respiratory distress and normal respiratory rate and effort.   Cardiovascular: Normal rate, regular rhythm, normal S1, normal S2 and edema was not present in the lower extremities.   Abdomen: Soft and nontender.   Musculoskeletal: Reproducible tenderness along the sternum.  Normal gait. Normal range of motion. There was no joint instability noted. Muscle strength and tone were normal.  Neurologic: Cranial nerves grossly intact.  No sensory deficits noted. No coordination deficits.   Psychiatric: Alert and awake, interactive and mood/affect were appropriate.   Skin, Hair, Nails: Normal skin color and pigmentation.  Breast: No palpable mass on the breast.        DIAGNOSTIC STUDIES:  LAB RESULTS:  Lab Services on 01/28/2021   Component Date Value Ref Range Status   • Fasting Status 01/28/2021 10  Hours Final   • Sodium 01/28/2021 139  135 - 145 mmol/L Final   • Potassium 01/28/2021 4.0  3.4 - 5.1 mmol/L Final   • Chloride 01/28/2021 105  98 - 107 mmol/L Final   • Carbon Dioxide 01/28/2021 27  21 - 32 mmol/L Final   • Anion Gap 01/28/2021 11  10 - 20 mmol/L Final   • Glucose 01/28/2021 87  65 - 99 mg/dL Final   • BUN 01/28/2021 12  6 - 20 mg/dL Final   • Creatinine 01/28/2021 0.85  0.51 - 0.95 mg/dL Final   • Glomerular Filtration Rate 01/28/2021 76* >90 mL/min/1.73m2 Final   • BUN/ Creatinine Ratio 01/28/2021 14  7 - 25 Final   • Calcium 01/28/2021 9.6  8.4 - 10.2 mg/dL Final   • Bilirubin, Total  01/28/2021 0.6  0.2 - 1.0 mg/dL Final   • GOT/AST 01/28/2021 22  <=37 Units/L Final   • GPT/ALT 01/28/2021 29  <64 Units/L Final   • Alkaline Phosphatase 01/28/2021 53  45 - 117 Units/L Final   • Albumin 01/28/2021 4.7  3.6 - 5.1 g/dL Final   • Protein, Total 01/28/2021 7.7  6.4 - 8.2 g/dL Final   • Globulin 01/28/2021 3.0  2.0 - 4.0 g/dL Final   • A/G Ratio 01/28/2021 1.6  1.0 - 2.4 Final   • Fasting Status 01/28/2021 10  Hours Final   • Cholesterol 01/28/2021 205* <=199 mg/dL Final   • Triglycerides 01/28/2021 592* <=149 mg/dL Final   • HDL 01/28/2021 46* >=50 mg/dL Final   • LDL 01/28/2021    Final   • Non-HDL Cholesterol 01/28/2021 159  mg/dL Final   • Cholesterol/ HDL Ratio 01/28/2021 4.5* <=4.4 Final   • TSH 01/28/2021 2.492  0.350 - 5.000 mcUnits/mL Final   • T4, Free 01/28/2021 0.7* 0.8 - 1.5 ng/dL Final   • T3, Free 01/28/2021 2.3  2.2 - 4.0 pg/mL Final   • Hemoglobin A1C 01/28/2021 5.1  4.5 - 5.6 % Final   • Iron 01/28/2021 145  50 - 170 mcg/dL Final   • Iron Binding Capacity 01/28/2021 428  250 - 450 mcg/dL Final   • Iron, Percent Saturation 01/28/2021 34  15 - 45 % Final   • Troponin I, Ultra Sensitive 01/28/2021 <0.02  <=0.04 ng/mL Final   • RBC Sedimentation Rate 01/28/2021 4  0 - 20 mm/hr Final   • Vitamin D, 25-Hydroxy 01/28/2021 31.2  30.0 - 100.0 ng/mL Final   • WBC 01/28/2021 6.3  4.2 - 11.0 K/mcL Final   • RBC 01/28/2021 4.70  4.00 - 5.20 mil/mcL Final   • HGB 01/28/2021 14.8  12.0 - 15.5 g/dL Final   • HCT 01/28/2021 45.1  36.0 - 46.5 % Final   • MCV 01/28/2021 96.0  78.0 - 100.0 fl Final   • MCH 01/28/2021 31.5  26.0 - 34.0 pg Final   • MCHC 01/28/2021 32.8  32.0 - 36.5 g/dL Final   • RDW-CV 01/28/2021 12.4  11.0 - 15.0 % Final   • RDW-SD 01/28/2021 43.9  39.0 - 50.0 fL Final   • PLT 01/28/2021 312  140 - 450 K/mcL Final   • NRBC 01/28/2021 0  <=0 /100 WBC Final   • Neutrophil, Percent 01/28/2021 51  % Final   • Lymphocytes, Percent 01/28/2021 33  % Final   • Mono, Percent 01/28/2021 9  % Final    • Eosinophils, Percent 01/28/2021 5  % Final   • Basophils, Percent 01/28/2021 1  % Final   • Immature Granulocytes 01/28/2021 1  % Final   • Absolute Neutrophils 01/28/2021 3.2  1.8 - 7.7 K/mcL Final   • Absolute Lymphocytes 01/28/2021 2.1  1.0 - 4.0 K/mcL Final   • Absolute Monocytes 01/28/2021 0.5  0.3 - 0.9 K/mcL Final   • Absolute Eosinophils  01/28/2021 0.3  0.0 - 0.5 K/mcL Final   • Absolute Basophils 01/28/2021 0.1  0.0 - 0.3 K/mcL Final   • Absolute Immmature Granulocytes 01/28/2021 0.0  0.0 - 0.2 K/mcL Final   • Fasting Status 01/28/2021 10  Hours Final   • LDL Direct 01/28/2021 88  <=129 mg/dL Final       ASSESSMENT AND PLAN:  This is a 56 year old female who presents with :  1. Chest pain, unspecified type    2. Depression with anxiety    3. Other insomnia    4. Hx of colonoscopy    5. Rheumatoid arthritis involving multiple sites with positive rheumatoid factor (CMS/[facility])    6. Primary hypothyroidism    7. Other hyperlipidemia    8. Anemia, unspecified type    9. Vitamin D deficiency        Orders Placed This Encounter   • XR CHEST PA AND LATERAL 2 VIEWS   • Comprehensive Metabolic Panel   • CBC with Automated Differential   • Lipid Panel With Reflex   • Thyroid Stimulating Hormone Reflex   • Free T4   • Free T3   • Glycohemoglobin   • Iron And total Iron Binding Capacity   • Troponin I Ultra Sensitive   • Sedimentation Rate Amanda   • Vitamin D -25 Hydroxy   • lamoTRIgine (LaMICtal) 25 MG tablet   • zolpidem (AMBIEN) 10 MG tablet   • ECHOCARDIOGRAM STRESS TEST W/ W/O IMAGING AGENT       Plan:    Chest pain, unspecified type:  Likely muscular.   Ordered chest x-ray, refer to orders.  Ordered Troponin ultra sensitive test, refer to orders.   Ordered Echo stress test, refer to orders.    Discussed the Echo stress test; significance explained.     Depression with anxiety:  Continue current medications (Alprazolam, Wellbutrin 300 mg and Lamictal).    Continue to follow-up with the psychiatrist as  scheduled.     Other insomnia:  Continue current medication (Ambien). Refills provided.    Recommended consulting the psychiatrist.     Hx of colonoscopy:  Will be due this year. Prefers to wait due to the covid pandemic.    Rheumatoid arthritis involving multiple sites with positive rheumatoid factor (CMS/[facility]):  Ordered labs, refer to orders.   Continue current medications.   Continue to follow up with the rheumatologist, Dr. Thomason as scheduled.     Primary hypothyroidism:  Ordered labs, refer to orders.   Continue current medications.     Other hyperlipidemia:  Ordered labs, refer to orders.   Continue current medications.     Anemia, unspecified type:  Ordered labs, refer to orders.     Vitamin D deficiency:  Ordered labs, refer to orders.     Informed cannot give the Shingles vaccine here due to Medicare. Advised to visit the pharmacy.   Discussed the COVID vaccine. Advised to watch the portal for the updates.    Follow up as recommended or sooner if need.    Total visit duration: 21 minutes.    Refer to orders.   Patient verbalized understanding of the plan and is in agreement with the plan.  Instructions provided as documented in the AVS.    Entered by Dr. Jenifer Powell acting as scribe for Dr. Caden Rondon MD     On admission Cr: 1.92 ->2.13 (baseline 0.9-1.1) -> 2.74 this morning. 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  - Bladder scan w/o signs of obstruction   - Patient urinating well: net -2L over 24hrs       Plan   - restart Acyclovir 800mg Q8, infuse over 2 hours   - Continue to trend Cr,   - aggressive IVF, target urine output at least 100cc/hr   - Avoid nephrotoxic agents as able  - Adjust medication dosages for level of renal function  - increase fluid rate to 150cc/hr NS, strict ins and outs On admission Cr: 1.92 ->2.13 (baseline 0.9-1.1) -> 2.74 this morning. 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  - Bladder scan w/o signs of obstruction   - Patient urinating well: net -2L over 24hrs       Plan   - continue fluid rate to 150cc/hr NS, strict ins and outs (tolerating PO fluids)  - restart Acyclovir 800mg Q8, infuse over 2 hours   - Continue to trend Cr  - aggressive IVF, target urine output at least 100cc/hr   - Avoid nephrotoxic agents as able  - Adjust medication dosages for level of renal function On admission Cr: 1.92 ->2.13 (baseline 0.9-1.1) -> 2.74 this morning. 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  - Bladder scan w/o signs of obstruction   - Patient urinating well: net -2L over 24hrs       Plan   - continue fluid rate to 150cc/hr NS, strict ins and outs (tolerating PO fluids)  - continue Acyclovir 800mg Q8, infuse over 2 hours   - Continue to trend Cr  - aggressive IVF, target urine output at least 100cc/hr   - Avoid nephrotoxic agents as able  - Adjust medication dosages for level of renal function

## 2025-06-09 NOTE — PROGRESS NOTE ADULT - PROBLEM SELECTOR PLAN 2
Previous Results:  -> LP 5/28: 1 RBC, 0 PMNs, Gram stain negative no organisms  -> CSF PCR from 5/28 LP: Negative  -> CTH non-con 5/28: Within Normal Limits  -> BCx 5/28: NG5D x 2 sets  -> VZV swab 5/29: negative  -> MRI brain with IV contrast 5/30: WNL  Denies headache, alert oriented.   - of note did clinically improve with starting empiric varicella treatment  - hold IV acyclovir 800mg q8hr x 14 days (last day June 11th) overnight, pending discussion with ID and nephro  - given rise in creatinine will need to continue aggressive hydration, s/p 3L NS in ED, start 125cc/hr NS  - goal urine output above 75 mL/hour  - complete recovery typically occurs within four to nine days after acyclovir is discontinued, will aggressively hydrate for now. No new neurologic findings.     Plan   - airborne and contact precautions  - ID and nephro consulted appreciate recs Previous Results:  -> LP 5/28: 1 RBC, 0 PMNs, Gram stain negative no organisms  -> CSF PCR from 5/28 LP: Negative  -> CTH non-con 5/28: Within Normal Limits  -> BCx 5/28: NG5D x 2 sets  -> VZV swab 5/29: negative  -> MRI brain with IV contrast 5/30: WNL  Denies headache, alert oriented.   - of note did clinically improve with starting empiric varicella treatment  - hold IV acyclovir 800mg q8hr x 14 days (last day June 11th) overnight, pending discussion with ID and nephro  - given rise in creatinine will need to continue aggressive hydration, s/p 3L NS in ED, start 125cc/hr NS  - goal urine output above 75 mL/hour  - complete recovery typically occurs within four to nine days after acyclovir is discontinued, will aggressively hydrate for now. No new neurologic findings.     Plan   - airborne and contact precautions  - ID and nephro consulted appreciate recs  - finish Acyclovir today inpatient Previous Results:  -> LP 5/28: 1 RBC, 0 PMNs, Gram stain negative no organisms  -> CSF PCR from 5/28 LP: Negative  -> CTH non-con 5/28: Within Normal Limits  -> BCx 5/28: NG5D x 2 sets  -> VZV swab 5/29: negative  -> MRI brain with IV contrast 5/30: WNL  Denies headache, alert oriented.   - of note did clinically improve with starting empiric varicella treatment  - hold IV acyclovir 800mg q8hr x 14 days (last day June 11th) overnight, pending discussion with ID and nephro  - given rise in creatinine will need to continue aggressive hydration, s/p 3L NS in ED, start 125cc/hr NS  - goal urine output above 75 mL/hour  - complete recovery typically occurs within four to nine days after acyclovir is discontinued, will aggressively hydrate for now. No new neurologic findings.     Plan   - airborne and contact precautions  - ID and nephro consulted appreciate recs  - Per nephro recs, pt to be discharged on oral valacyclovir  - finish Acyclovir today inpatient

## 2025-06-09 NOTE — DISCHARGE NOTE NURSING/CASE MANAGEMENT/SOCIAL WORK - NSDCPEFALRISK_GEN_ALL_CORE
For information on Fall & Injury Prevention, visit: [email or web address] OR  [email or web address] OR  https://www.cdc.gov/steadi/patient.html

## 2025-06-09 NOTE — DISCHARGE NOTE PROVIDER - HOSPITAL COURSE
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    Hospital course (by problem):       #CONCEPCIÓN (acute kidney injury).   On admission Cr: 1.92 ->2.13 (baseline 0.9-1.1) -> 2.74 this morning. 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  - Bladder scan w/o signs of obstruction   - Patient urinating well: net -2L over 24hrs   - continued fluid rate to 150cc/hr NS, strict ins and outs (tolerating PO fluids) - d/c on discharge   - continue Acyclovir 800mg Q8, infuse over 2 hours   - Continue to trend Cr  - aggressive IVF, target urine output at least 100cc/hr   - Avoid nephrotoxic agents as able  - Adjust medication dosages for level of renal function.      #Varicella zoster meningitis.     Previous Results:  -> LP 5/28: 1 RBC, 0 PMNs, Gram stain negative no organisms  -> CSF PCR from 5/28 LP: Negative  -> CTH non-con 5/28: Within Normal Limits  -> BCx 5/28: NG5D x 2 sets  -> VZV swab 5/29: negative  -> MRI brain with IV contrast 5/30: WNL  Denies headache, alert oriented.   - of note did clinically improve with starting empiric varicella treatment  - hold IV acyclovir 800mg q8hr x 14 days (last day June 11th) overnight, pending discussion with ID and nephro  - given rise in creatinine will need to continue aggressive hydration, s/p 3L NS in ED, start 125cc/hr NS  - goal urine output above 75 mL/hour  - complete recovery typically occurs within four to nine days after acyclovir is discontinued, will aggressively hydrate for now. No new neurologic findings.     Plan   - airborne and contact precautions  - ID and nephro consulted appreciate recs  - Per nephro recs, pt to be discharged on oral valacyclovir  - finish Acyclovir today inpatient.      #Abdominal pain.   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 Unlikely a surgical abdomen picture, lack of rebound guarding. No white count to suggest infective process. Lipase normal.  CT A/P without acute findings which is reassuring.   - serial abdominal exams were benign. EKG without ST elevations or any acute changes   - Tylenol 650mg PO q6 hr standing, hydromorphone 2mg q4hr prn for moderate pain, hydromorphone 4mg q4hr prn for severe pain  - bowel regimen while on pain meds.  - resolved on 6/9         #Anxiety.   Known, multi year history of anxiety. Denies SI/HI. Mood on exam appropriate.  Home medications: diazepam 2mg BID  - c home medications            Patient was discharged to: home     New medications:   Changes to old medications:  Medications that were stopped:    Items to follow up as outpatient:    Physical exam at the time of discharge:

## 2025-06-09 NOTE — PROGRESS NOTE ADULT - SUBJECTIVE AND OBJECTIVE BOX
***Note in progress***    OVERNIGHT EVENTS: NAEO    SUBJECTIVE / INTERVAL HPI: Patient seen and examined at bedside. Patient denying chest pain, SOB, palpitations, cough. Patient denies fever, chills, HA, Dizziness, N/V, abdominal pain, diarrhea, constipation, hematochezia/melena, dysuria, hematuria, new onset weakness/numbness, LE pain and/or swelling.    Remaining ROS negative       PHYSICAL EXAM:    General:NAD.   HEENT: NC/AT; PERRL, anicteric sclera; MMM  Neck: supple  Cardiovascular: +S1/S2, RRR  Respiratory: CTA B/L; no W/R/R  Gastrointestinal: soft, NT/ND; +BSx4  Extremities: WWP; no edema, clubbing or cyanosis  Vascular: 2+ radial, DP/PT pulses B/L  Neurological: AAOx3; no focal deficits  Psychiatric: pleasant mood and affect  Dermatologic: no appreciable wounds or damage to the skin    VITAL SIGNS:  Vital Signs Last 24 Hrs  T(C): 36.4 (09 Jun 2025 06:00), Max: 36.8 (08 Jun 2025 12:07)  T(F): 97.6 (09 Jun 2025 06:00), Max: 98.2 (08 Jun 2025 12:07)  HR: 74 (09 Jun 2025 06:00) (67 - 75)  BP: 130/84 (09 Jun 2025 06:00) (122/75 - 134/85)  BP(mean): 99 (09 Jun 2025 06:00) (99 - 99)  RR: 18 (09 Jun 2025 06:00) (18 - 19)  SpO2: 98% (09 Jun 2025 06:00) (94% - 99%)    Parameters below as of 09 Jun 2025 06:00  Patient On (Oxygen Delivery Method): room air          MEDICATIONS:  MEDICATIONS  (STANDING):  acetaminophen     Tablet .. 650 milliGRAM(s) Oral every 6 hours  acyclovir IVPB 800 milliGRAM(s) IV Intermittent every 8 hours  acyclovir IVPB      diazepam    Tablet 2 milliGRAM(s) Oral every 12 hours  polyethylene glycol 3350 17 Gram(s) Oral daily  senna 2 Tablet(s) Oral at bedtime  sodium chloride 0.9%. 1000 milliLiter(s) (150 mL/Hr) IV Continuous <Continuous>    MEDICATIONS  (PRN):  aluminum hydroxide/magnesium hydroxide/simethicone Suspension 30 milliLiter(s) Oral every 4 hours PRN Dyspepsia  HYDROmorphone   Tablet 2 milliGRAM(s) Oral every 4 hours PRN Moderate Pain (4 - 6)  HYDROmorphone   Tablet 4 milliGRAM(s) Oral every 4 hours PRN Severe Pain (7 - 10)  melatonin 3 milliGRAM(s) Oral at bedtime PRN Insomnia  ondansetron Injectable 4 milliGRAM(s) IV Push every 8 hours PRN Nausea and/or Vomiting      ALLERGIES:  Allergies    No Known Allergies    Intolerances        LABS:                        10.5   5.19  )-----------( 126      ( 08 Jun 2025 05:30 )             32.0     06-08    141  |  111[H]  |  19  ----------------------------<  97  4.4   |  22  |  1.92[H]    Ca    8.4      08 Jun 2025 05:30  Phos  4.7     06-08  Mg     2.0     06-08    TPro  5.3[L]  /  Alb  3.5  /  TBili  0.2  /  DBili  x   /  AST  17  /  ALT  12  /  AlkPhos  56  06-08      Urinalysis Basic - ( 08 Jun 2025 05:30 )    Color: x / Appearance: x / SG: x / pH: x  Gluc: 97 mg/dL / Ketone: x  / Bili: x / Urobili: x   Blood: x / Protein: x / Nitrite: x   Leuk Esterase: x / RBC: x / WBC x   Sq Epi: x / Non Sq Epi: x / Bacteria: x      CAPILLARY BLOOD GLUCOSE          RADIOLOGY & ADDITIONAL TESTS: Reviewed. ***Note in progress***    OVERNIGHT EVENTS: NAEO    SUBJECTIVE / INTERVAL HPI: Patient seen and examined at bedside. Patient denying chest pain, SOB, palpitations, cough. Patient denies fever, chills, HA, Dizziness, N/V, abdominal pain, diarrhea, constipation, hematochezia/melena, dysuria, hematuria, new onset weakness/numbness, LE pain and/or swelling. Reports one bowel movement overnight. Admits to frequent urination, with one episode this morning. States he has been sleeping well. Pt states the last episode of flank pain was last night, 6/10 and shooting. Does not report flank pain as of this morning.    Remaining ROS negative       PHYSICAL EXAM:    General: NAD.   HEENT: NC/AT; PERRL, anicteric sclera; MMM. Patient does not report floaters in the eyes.  Neck: supple  Cardiovascular: +S1/S2, RRR  Respiratory: CTA B/L; no W/R/R  Gastrointestinal: soft, NT/ND; +BSx4  Back: No flank pain on percussion b/l  Extremities: WWP; no edema, clubbing or cyanosis  Vascular: 2+ radial, DP/PT pulses B/L  Neurological: AAOx3; no focal deficits  Psychiatric: pleasant mood and affect  Dermatologic: no appreciable wounds or damage to the skin. There is no scabbing appreciable on the R side of the scalp where previous shingles was located.     VITAL SIGNS:  Vital Signs Last 24 Hrs  T(C): 36.4 (09 Jun 2025 06:00), Max: 36.8 (08 Jun 2025 12:07)  T(F): 97.6 (09 Jun 2025 06:00), Max: 98.2 (08 Jun 2025 12:07)  HR: 74 (09 Jun 2025 06:00) (67 - 75)  BP: 130/84 (09 Jun 2025 06:00) (122/75 - 134/85)  BP(mean): 99 (09 Jun 2025 06:00) (99 - 99)  RR: 18 (09 Jun 2025 06:00) (18 - 19)  SpO2: 98% (09 Jun 2025 06:00) (94% - 99%)    Parameters below as of 09 Jun 2025 06:00  Patient On (Oxygen Delivery Method): room air          MEDICATIONS:  MEDICATIONS  (STANDING):  acetaminophen     Tablet .. 650 milliGRAM(s) Oral every 6 hours  acyclovir IVPB 800 milliGRAM(s) IV Intermittent every 8 hours  acyclovir IVPB      diazepam    Tablet 2 milliGRAM(s) Oral every 12 hours  polyethylene glycol 3350 17 Gram(s) Oral daily  senna 2 Tablet(s) Oral at bedtime  sodium chloride 0.9%. 1000 milliLiter(s) (150 mL/Hr) IV Continuous <Continuous>    MEDICATIONS  (PRN):  aluminum hydroxide/magnesium hydroxide/simethicone Suspension 30 milliLiter(s) Oral every 4 hours PRN Dyspepsia  HYDROmorphone   Tablet 2 milliGRAM(s) Oral every 4 hours PRN Moderate Pain (4 - 6)  HYDROmorphone   Tablet 4 milliGRAM(s) Oral every 4 hours PRN Severe Pain (7 - 10)  melatonin 3 milliGRAM(s) Oral at bedtime PRN Insomnia  ondansetron Injectable 4 milliGRAM(s) IV Push every 8 hours PRN Nausea and/or Vomiting      ALLERGIES:  Allergies    No Known Allergies    Intolerances        LABS:                        10.5   5.19  )-----------( 126      ( 08 Jun 2025 05:30 )             32.0     06-08    141  |  111[H]  |  19  ----------------------------<  97  4.4   |  22  |  1.92[H]    Ca    8.4      08 Jun 2025 05:30  Phos  4.7     06-08  Mg     2.0     06-08    TPro  5.3[L]  /  Alb  3.5  /  TBili  0.2  /  DBili  x   /  AST  17  /  ALT  12  /  AlkPhos  56  06-08      Urinalysis Basic - ( 08 Jun 2025 05:30 )    Color: x / Appearance: x / SG: x / pH: x  Gluc: 97 mg/dL / Ketone: x  / Bili: x / Urobili: x   Blood: x / Protein: x / Nitrite: x   Leuk Esterase: x / RBC: x / WBC x   Sq Epi: x / Non Sq Epi: x / Bacteria: x      CAPILLARY BLOOD GLUCOSE          RADIOLOGY & ADDITIONAL TESTS: Reviewed. ***Note in progress***    OVERNIGHT EVENTS: NAEO    SUBJECTIVE / INTERVAL HPI: Patient seen and examined at bedside. Patient denying chest pain, SOB, palpitations, cough. Patient denies fever, chills, HA, Dizziness, N/V, abdominal pain, diarrhea, constipation, hematochezia/melena, dysuria, hematuria, new onset weakness/numbness, LE pain and/or swelling. Reports one bowel movement overnight. Admits to frequent urination, with one episode this morning. States he has been sleeping well. Pt states the last episode of flank pain was last night, 6/10 and shooting. Does not report flank pain as of this morning. Stated one episode of frothy urine two days ago but says it has since resolved. Pt previously complained of floaters but states they have since resolved.     Remaining ROS negative       PHYSICAL EXAM:    General: NAD.   HEENT: NC/AT; PERRL, anicteric sclera; MMM.   Neck: supple  Cardiovascular: +S1/S2, RRR  Respiratory: CTA B/L; no W/R/R  Gastrointestinal: soft, NT/ND; +BSx4  Back: No flank pain on percussion b/l  Extremities: WWP; no edema, clubbing or cyanosis  Vascular: 2+ radial, DP/PT pulses B/L  Neurological: AAOx3; no focal deficits  Psychiatric: pleasant mood and affect  Dermatologic: no appreciable wounds or damage to the skin. There is no scabbing appreciable on the R side of the scalp in previous location of shingles rash.    VITAL SIGNS:  Vital Signs Last 24 Hrs  T(C): 36.4 (09 Jun 2025 06:00), Max: 36.8 (08 Jun 2025 12:07)  T(F): 97.6 (09 Jun 2025 06:00), Max: 98.2 (08 Jun 2025 12:07)  HR: 74 (09 Jun 2025 06:00) (67 - 75)  BP: 130/84 (09 Jun 2025 06:00) (122/75 - 134/85)  BP(mean): 99 (09 Jun 2025 06:00) (99 - 99)  RR: 18 (09 Jun 2025 06:00) (18 - 19)  SpO2: 98% (09 Jun 2025 06:00) (94% - 99%)    Parameters below as of 09 Jun 2025 06:00  Patient On (Oxygen Delivery Method): room air      MEDICATIONS:  MEDICATIONS  (STANDING):  acetaminophen     Tablet .. 650 milliGRAM(s) Oral every 6 hours  acyclovir IVPB 800 milliGRAM(s) IV Intermittent every 8 hours  acyclovir IVPB      diazepam    Tablet 2 milliGRAM(s) Oral every 12 hours  polyethylene glycol 3350 17 Gram(s) Oral daily  senna 2 Tablet(s) Oral at bedtime  sodium chloride 0.9%. 1000 milliLiter(s) (150 mL/Hr) IV Continuous <Continuous>    MEDICATIONS  (PRN):  aluminum hydroxide/magnesium hydroxide/simethicone Suspension 30 milliLiter(s) Oral every 4 hours PRN Dyspepsia  HYDROmorphone   Tablet 2 milliGRAM(s) Oral every 4 hours PRN Moderate Pain (4 - 6)  HYDROmorphone   Tablet 4 milliGRAM(s) Oral every 4 hours PRN Severe Pain (7 - 10)  melatonin 3 milliGRAM(s) Oral at bedtime PRN Insomnia  ondansetron Injectable 4 milliGRAM(s) IV Push every 8 hours PRN Nausea and/or Vomiting      ALLERGIES:  Allergies    No Known Allergies    Intolerances        LABS:                        11.6   4.96  )-----------( 160      ( 09 Jun 2025 05:30 )             34.6     06-09    142  |  107  |  13  ----------------------------<  105[H]  4.2   |  25  |  1.41[H]    Ca    8.9      09 Jun 2025 05:30  Phos  3.6     06-09  Mg     2.0     06-09    TPro  6.0  /  Alb  3.9  /  TBili  0.2  /  DBili  x   /  AST  17  /  ALT  16  /  AlkPhos  57  06-09      Urinalysis Basic - ( 09 Jun 2025 05:30 )    Color: x / Appearance: x / SG: x / pH: x  Gluc: 105 mg/dL / Ketone: x  / Bili: x / Urobili: x   Blood: x / Protein: x / Nitrite: x   Leuk Esterase: x / RBC: x / WBC x   Sq Epi: x / Non Sq Epi: x / Bacteria: x      CAPILLARY BLOOD GLUCOSE          RADIOLOGY & ADDITIONAL TESTS: Reviewed.

## 2025-06-09 NOTE — PROGRESS NOTE ADULT - ASSESSMENT
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

## 2025-06-09 NOTE — PROGRESS NOTE ADULT - ASSESSMENT
29M w/ hx anxiety but otherwise no medical history, who was admitted to [facility] from 5/28-31 after ~1 week prior developed an outbreak of vesicles along R forehead which did not improve on PO valtrex (did see ophtho w/ no e/o eye involvement). On admission he also had sx of meningitis (photophobia, HA, neck stiffness, vomiting, ataxia, word finding difficulties). LP showed mostly normal CSF, TNC 0, slightly high prot 55, CSF PCR neg. A swab of R forehead lesions was negative for HSV/VZV. MRI brain w/wo contrast negative. HIV screen negative. His sx improved on IV acyclovir, and he was discharged on 5/31 with plan to complete a 14 day course of acyclovir 10mg/kg IV q8h (EOT 6/11) for empiric tx of VZV with CNS involvement. With this treatment his symptoms continued to improve at home, rash crusted, and all neuro sx above improved to near resolution. He then came to ED on 6/4 with redness of R eye without any visual changes/floaters/eye pain – thought to be a viral conjunctivitis, and eye redness subsequently improved. He then returned to ED on 6/6 with acute onset of bilateral flank pain and labs showed an acute rise in SCr from ~1 -> 2 and now up to 2.74. UA bland. CT renal stone hunt negative. Admitted for further management. Acyclovir was held on 6/6 (while in ED and being admitted). On 6/7 we gave a dose of ganciclovir, and today we are resuming IV acyclovir as below. Appreciate ophtho evaluation on 6/8 - exam negative for ocular infection.    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     ID Team 1 to sign off.  29M w/ hx anxiety but otherwise no medical history, who was admitted to [facility] from 5/28-31 after ~1 week prior developed an outbreak of vesicles along R forehead which did not improve on PO valtrex (did see ophtho w/ no e/o eye involvement). On admission he also had sx of meningitis (photophobia, HA, neck stiffness, vomiting, ataxia, word finding difficulties). LP showed mostly normal CSF, TNC 0, slightly high prot 55, CSF PCR neg. A swab of R forehead lesions was negative for HSV/VZV. MRI brain w/wo contrast negative. HIV screen negative. His sx improved on IV acyclovir, and he was discharged on 5/31 with plan to complete a 14 day course of acyclovir 10mg/kg IV q8h (EOT 6/11) for empiric tx of VZV with CNS involvement. With this treatment his symptoms continued to improve at home, rash crusted, and all neuro sx above improved to near resolution. He then came to ED on 6/4 with redness of R eye without any visual changes/floaters/eye pain – thought to be a viral conjunctivitis, and eye redness subsequently improved. He then returned to ED on 6/6 with acute onset of bilateral flank pain and labs showed an acute rise in SCr from ~1 -> 2 and now up to 2.74. UA bland. CT renal stone hunt negative. Admitted for further management. Acyclovir was held on 6/6 (while in ED and being admitted). On 6/7 we gave a dose of ganciclovir, and on 6/8 we resuming IV acyclovir (slow infusion, with aggressive IV hydration). Appreciate ophtho evaluation on 6/8 - exam negative for ocular infection.    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   - Patient to follow up with Dr. Candelario in 2 weeks ([phone]), ID office will call patient to schedule     ID Team 1 to sign off. Please call if further ID input is desired. 29M w/ hx anxiety but otherwise no medical history, who was admitted to [facility] from 5/28-31 after ~1 week prior developed an outbreak of vesicles along R forehead which did not improve on PO valtrex (did see ophtho w/ no e/o eye involvement). On admission he also had sx of meningitis (photophobia, HA, neck stiffness, vomiting, ataxia, word finding difficulties). LP showed mostly normal CSF, TNC 0, slightly high prot 55, CSF PCR neg. A swab of R forehead lesions was negative for HSV/VZV. MRI brain w/wo contrast negative. HIV screen negative. His sx improved on IV acyclovir, and he was discharged on 5/31 with plan to complete a 14 day course of acyclovir 10mg/kg IV q8h (EOT 6/11) for empiric tx of VZV with CNS involvement. With this treatment his symptoms continued to improve at home, rash crusted, and all neuro sx above improved to near resolution. He then came to ED on 6/4 with redness of R eye without any visual changes/floaters/eye pain – thought to be a viral conjunctivitis, and eye redness subsequently improved. He then returned to ED on 6/6 with acute onset of bilateral flank pain and labs showed an acute rise in SCr from ~1 -> 2 and now up to 2.74. UA bland. CT renal stone hunt negative. Admitted for further management. Acyclovir was held on 6/6 (while in ED and being admitted). On 6/7 we gave a dose of ganciclovir, and on 6/8 we resuming IV acyclovir (slow infusion, with aggressive IV hydration). Appreciate ophtho evaluation on 6/8 - exam negative for ocular infection.    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  - Remove LUE PICC  - Patient to follow up with Dr. Candelario in 2 weeks ([phone]), ID office will call patient to schedule     ID Team 1 to sign off. Please call if further ID input is desired.

## 2025-06-09 NOTE — DISCHARGE NOTE NURSING/CASE MANAGEMENT/SOCIAL WORK - PATIENT PORTAL LINK FT
You can access the FollowMyHealth Patient Portal offered by [facility] by registering at the following website: [email or web address]. By joining [facility]’s FollowMyHealth portal, you will also be able to view your health information using other applications (apps) compatible with our system.

## 2025-06-13 DIAGNOSIS — N17.8 OTHER ACUTE KIDNEY FAILURE: ICD-10-CM

## 2025-06-13 DIAGNOSIS — B02.1 ZOSTER MENINGITIS: ICD-10-CM

## 2025-06-13 DIAGNOSIS — N17.9 ACUTE KIDNEY FAILURE, UNSPECIFIED: ICD-10-CM

## 2025-07-10 PROBLEM — Z00.00 ENCOUNTER FOR PREVENTIVE HEALTH EXAMINATION: Status: ACTIVE | Noted: 2025-07-10

## 2025-07-15 ENCOUNTER — NON-APPOINTMENT (OUTPATIENT)
Age: 30
End: 2025-07-15

## 2025-07-30 RX ORDER — VALACYCLOVIR 1 G/1
1 TABLET, FILM COATED ORAL EVERY 8 HOURS
Qty: 30 | Refills: 0 | Status: ACTIVE | COMMUNITY
Start: 2025-07-30 | End: 1900-01-01

## 2025-08-06 ENCOUNTER — TRANSCRIPTION ENCOUNTER (OUTPATIENT)
Age: 30
End: 2025-08-06

## 2025-08-06 ENCOUNTER — EMERGENCY (EMERGENCY)
Age: 30
LOS: 1 days | End: 2025-08-06
Attending: EMERGENCY MEDICINE | Admitting: EMERGENCY MEDICINE
Payer: COMMERCIAL

## 2025-08-06 VITALS
HEART RATE: 85 BPM | OXYGEN SATURATION: 99 % | DIASTOLIC BLOOD PRESSURE: 104 MMHG | SYSTOLIC BLOOD PRESSURE: 163 MMHG | RESPIRATION RATE: 18 BRPM | TEMPERATURE: 98 F

## 2025-08-06 VITALS
SYSTOLIC BLOOD PRESSURE: 154 MMHG | HEART RATE: 111 BPM | DIASTOLIC BLOOD PRESSURE: 105 MMHG | OXYGEN SATURATION: 98 % | RESPIRATION RATE: 18 BRPM | TEMPERATURE: 98 F

## 2025-08-06 DIAGNOSIS — Z53.29 PROCEDURE AND TREATMENT NOT CARRIED OUT BECAUSE OF PATIENT'S DECISION FOR OTHER REASONS: ICD-10-CM

## 2025-08-06 DIAGNOSIS — R51.9 HEADACHE, UNSPECIFIED: ICD-10-CM

## 2025-08-06 DIAGNOSIS — G43.909 MIGRAINE, UNSPECIFIED, NOT INTRACTABLE, WITHOUT STATUS MIGRAINOSUS: ICD-10-CM

## 2025-08-06 PROCEDURE — 99283 EMERGENCY DEPT VISIT LOW MDM: CPT

## 2025-08-11 ENCOUNTER — APPOINTMENT (OUTPATIENT)
Dept: INFECTIOUS DISEASE | Facility: CLINIC | Age: 30
End: 2025-08-11
Payer: COMMERCIAL

## 2025-08-11 ENCOUNTER — NON-APPOINTMENT (OUTPATIENT)
Age: 30
End: 2025-08-11

## 2025-08-11 VITALS
OXYGEN SATURATION: 98 % | TEMPERATURE: 98.3 F | HEART RATE: 100 BPM | HEIGHT: 73 IN | WEIGHT: 175 LBS | SYSTOLIC BLOOD PRESSURE: 169 MMHG | DIASTOLIC BLOOD PRESSURE: 115 MMHG | BODY MASS INDEX: 23.19 KG/M2

## 2025-08-11 VITALS — DIASTOLIC BLOOD PRESSURE: 101 MMHG | SYSTOLIC BLOOD PRESSURE: 168 MMHG

## 2025-08-11 DIAGNOSIS — R51.9 HEADACHE, UNSPECIFIED: ICD-10-CM

## 2025-08-11 PROCEDURE — 99215 OFFICE O/P EST HI 40 MIN: CPT

## 2025-08-11 PROCEDURE — G2211 COMPLEX E/M VISIT ADD ON: CPT | Mod: NC

## 2025-08-12 ENCOUNTER — APPOINTMENT (OUTPATIENT)
Dept: NEPHROLOGY | Facility: CLINIC | Age: 30
End: 2025-08-12
Payer: COMMERCIAL

## 2025-08-12 ENCOUNTER — NON-APPOINTMENT (OUTPATIENT)
Age: 30
End: 2025-08-12

## 2025-08-12 VITALS — DIASTOLIC BLOOD PRESSURE: 94 MMHG | SYSTOLIC BLOOD PRESSURE: 139 MMHG

## 2025-08-12 DIAGNOSIS — I10 ESSENTIAL (PRIMARY) HYPERTENSION: ICD-10-CM

## 2025-08-12 LAB
ALBUMIN SERPL ELPH-MCNC: 5.3 G/DL
ALP BLD-CCNC: 72 U/L
ALT SERPL-CCNC: 27 U/L
ANION GAP SERPL CALC-SCNC: 13 MMOL/L
AST SERPL-CCNC: 25 U/L
BASOPHILS # BLD AUTO: 0.05 K/UL
BASOPHILS NFR BLD AUTO: 0.9 %
BILIRUB SERPL-MCNC: 0.2 MG/DL
BUN SERPL-MCNC: 16 MG/DL
CALCIUM SERPL-MCNC: 10.5 MG/DL
CD16+CD56+ CELLS # BLD: 186 CELLS/UL
CD16+CD56+ CELLS NFR BLD: 11 %
CD19 CELLS NFR BLD: 293 CELLS/UL
CD3 CELLS # BLD: 1279 CELLS/UL
CD3 CELLS NFR BLD: 71 %
CD3+CD4+ CELLS # BLD: 905 CELLS/UL
CD3+CD4+ CELLS NFR BLD: 49 %
CD3+CD4+ CELLS/CD3+CD8+ CLL SPEC: 2.55 RATIO
CD3+CD8+ CELLS # SPEC: 355 CELLS/UL
CD3+CD8+ CELLS NFR BLD: 19 %
CELLS.CD3-CD19+/CELLS IN BLOOD: 17 %
CHLORIDE SERPL-SCNC: 104 MMOL/L
CO2 SERPL-SCNC: 27 MMOL/L
CREAT SERPL-MCNC: 0.86 MG/DL
CRP SERPL-MCNC: 3 MG/L
EGFRCR SERPLBLD CKD-EPI 2021: 119 ML/MIN/1.73M2
EOSINOPHIL # BLD AUTO: 0.12 K/UL
EOSINOPHIL NFR BLD AUTO: 2.1 %
GLUCOSE SERPL-MCNC: 94 MG/DL
HCT VFR BLD CALC: 43.3 %
HGB BLD-MCNC: 14 G/DL
IMM GRANULOCYTES NFR BLD AUTO: 0.2 %
LYMPHOCYTES # BLD AUTO: 1.89 K/UL
LYMPHOCYTES NFR BLD AUTO: 33.8 %
MAN DIFF?: NORMAL
MCHC RBC-ENTMCNC: 31 PG
MCHC RBC-ENTMCNC: 32.3 G/DL
MCV RBC AUTO: 95.8 FL
MONOCYTES # BLD AUTO: 0.5 K/UL
MONOCYTES NFR BLD AUTO: 8.9 %
NEUTROPHILS # BLD AUTO: 3.02 K/UL
NEUTROPHILS NFR BLD AUTO: 54.1 %
PLATELET # BLD AUTO: 257 K/UL
POTASSIUM SERPL-SCNC: 4.5 MMOL/L
PROT SERPL-MCNC: 7.7 G/DL
RBC # BLD: 4.52 M/UL
RBC # FLD: 12.5 %
SODIUM SERPL-SCNC: 143 MMOL/L
TSH SERPL-ACNC: 3.45 UIU/ML
VIABILITY: NORMAL
WBC # FLD AUTO: 5.59 K/UL

## 2025-08-12 PROCEDURE — 99215 OFFICE O/P EST HI 40 MIN: CPT

## 2025-08-13 ENCOUNTER — TRANSCRIPTION ENCOUNTER (OUTPATIENT)
Age: 30
End: 2025-08-13

## 2025-08-13 LAB
ANA TITR SER: NEGATIVE
APPEARANCE: CLEAR
BACTERIA: NEGATIVE /HPF
BILIRUBIN URINE: NEGATIVE
BLOOD URINE: NEGATIVE
C3 SERPL-MCNC: 136 MG/DL
C4 SERPL-MCNC: 31 MG/DL
CAST: 0 /LPF
COLOR: YELLOW
CREAT SPEC-SCNC: 112 MG/DL
CREAT/PROT UR: 0.1 RATIO
DSDNA AB SER-ACNC: <1 IU/ML
ENA SS-A AB SER-ACNC: <0.2 AL
ENA SS-B AB SER-ACNC: <0.2 AL
EPITHELIAL CELLS: 0 /HPF
ERYTHROCYTE [SEDIMENTATION RATE] IN BLOOD BY WESTERGREN METHOD: 2 MM/HR
GLUCOSE QUALITATIVE U: NEGATIVE MG/DL
IGA SERPL-MCNC: 154 MG/DL
IGG SERPL-MCNC: 1007 MG/DL
IGG1 SER-MCNC: 559 MG/DL
IGG2 SER-MCNC: 301 MG/DL
IGG3 SER-MCNC: 41.7 MG/DL
IGG4 SER-MCNC: 64.4 MG/DL
KETONES URINE: NEGATIVE MG/DL
LEUKOCYTE ESTERASE URINE: NEGATIVE
MICROSCOPIC-UA: NORMAL
NITRITE URINE: NEGATIVE
PH URINE: 6
PROT UR-MCNC: 5 MG/DL
PROTEIN URINE: NEGATIVE MG/DL
RED BLOOD CELLS URINE: 0 /HPF
SMOOTH MUSCLE AB SER QL IF: NORMAL
SPECIFIC GRAVITY URINE: 1.02
UROBILINOGEN URINE: 0.2 MG/DL
WHITE BLOOD CELLS URINE: 0 /HPF

## 2025-08-14 ENCOUNTER — NON-APPOINTMENT (OUTPATIENT)
Age: 30
End: 2025-08-14

## 2025-08-18 LAB
METANEPHRINE, PL: 59.2 PG/ML
NORMETANEPHRINE, PL: 232.4 PG/ML

## 2025-08-22 ENCOUNTER — APPOINTMENT (OUTPATIENT)
Facility: CLINIC | Age: 30
End: 2025-08-22

## 2025-08-26 ENCOUNTER — APPOINTMENT (OUTPATIENT)
Dept: INFECTIOUS DISEASE | Facility: CLINIC | Age: 30
End: 2025-08-26

## 2025-08-27 ENCOUNTER — APPOINTMENT (OUTPATIENT)
Dept: INFECTIOUS DISEASE | Facility: CLINIC | Age: 30
End: 2025-08-27

## 2025-09-16 ENCOUNTER — APPOINTMENT (OUTPATIENT)
Dept: NEPHROLOGY | Facility: CLINIC | Age: 30
End: 2025-09-16